# Patient Record
Sex: MALE | Race: BLACK OR AFRICAN AMERICAN
[De-identification: names, ages, dates, MRNs, and addresses within clinical notes are randomized per-mention and may not be internally consistent; named-entity substitution may affect disease eponyms.]

---

## 2019-08-30 ENCOUNTER — HOSPITAL ENCOUNTER (INPATIENT)
Dept: HOSPITAL 74 - JER | Age: 44
LOS: 7 days | Discharge: NURSING, REHAB OR LONG-TERM CARE | DRG: 60 | End: 2019-09-06
Attending: INTERNAL MEDICINE | Admitting: INTERNAL MEDICINE
Payer: COMMERCIAL

## 2019-08-30 VITALS — BODY MASS INDEX: 28.8 KG/M2

## 2019-08-30 DIAGNOSIS — K21.9: ICD-10-CM

## 2019-08-30 DIAGNOSIS — N39.498: ICD-10-CM

## 2019-08-30 DIAGNOSIS — E66.3: ICD-10-CM

## 2019-08-30 DIAGNOSIS — G89.29: ICD-10-CM

## 2019-08-30 DIAGNOSIS — G47.00: ICD-10-CM

## 2019-08-30 DIAGNOSIS — F41.8: ICD-10-CM

## 2019-08-30 DIAGNOSIS — E05.90: ICD-10-CM

## 2019-08-30 DIAGNOSIS — G35: Primary | ICD-10-CM

## 2019-08-30 DIAGNOSIS — F32.9: ICD-10-CM

## 2019-08-30 DIAGNOSIS — H53.8: ICD-10-CM

## 2019-08-30 DIAGNOSIS — R26.9: ICD-10-CM

## 2019-08-30 LAB
ALBUMIN SERPL-MCNC: 4.1 G/DL (ref 3.4–5)
ALP SERPL-CCNC: 80 U/L (ref 45–117)
ALT SERPL-CCNC: 44 U/L (ref 13–61)
ANION GAP SERPL CALC-SCNC: 4 MMOL/L (ref 8–16)
APPEARANCE UR: CLEAR
AST SERPL-CCNC: 18 U/L (ref 15–37)
BASOPHILS # BLD: 0.6 % (ref 0–2)
BILIRUB SERPL-MCNC: 1 MG/DL (ref 0.2–1)
BILIRUB UR STRIP.AUTO-MCNC: NEGATIVE MG/DL
BUN SERPL-MCNC: 12 MG/DL (ref 7–18)
CALCIUM SERPL-MCNC: 9.4 MG/DL (ref 8.5–10.1)
CHLORIDE SERPL-SCNC: 108 MMOL/L (ref 98–107)
CO2 SERPL-SCNC: 29 MMOL/L (ref 21–32)
COLOR UR: YELLOW
CREAT SERPL-MCNC: 1.1 MG/DL (ref 0.55–1.3)
DEPRECATED RDW RBC AUTO: 13.1 % (ref 11.9–15.9)
EOSINOPHIL # BLD: 2.3 % (ref 0–4.5)
GLUCOSE SERPL-MCNC: 89 MG/DL (ref 74–106)
HCT VFR BLD CALC: 40.4 % (ref 35.4–49)
HGB BLD-MCNC: 14 GM/DL (ref 11.7–16.9)
KETONES UR QL STRIP: (no result)
LEUKOCYTE ESTERASE UR QL STRIP.AUTO: NEGATIVE
LYMPHOCYTES # BLD: 36.4 % (ref 8–40)
MCH RBC QN AUTO: 30.5 PG (ref 25.7–33.7)
MCHC RBC AUTO-ENTMCNC: 34.7 G/DL (ref 32–35.9)
MCV RBC: 87.8 FL (ref 80–96)
MONOCYTES # BLD AUTO: 9.9 % (ref 3.8–10.2)
NEUTROPHILS # BLD: 50.8 % (ref 42.8–82.8)
NITRITE UR QL STRIP: NEGATIVE
PH UR: 7 [PH] (ref 5–8)
PLATELET # BLD AUTO: 271 K/MM3 (ref 134–434)
PMV BLD: 6.4 FL (ref 7.5–11.1)
POTASSIUM SERPLBLD-SCNC: 3.9 MMOL/L (ref 3.5–5.1)
PROT SERPL-MCNC: 6.7 G/DL (ref 6.4–8.2)
PROT UR QL STRIP: NEGATIVE
PROT UR QL STRIP: NEGATIVE
RBC # BLD AUTO: 4.59 M/MM3 (ref 4–5.6)
SODIUM SERPL-SCNC: 140 MMOL/L (ref 136–145)
SP GR UR: 1.02 (ref 1.01–1.03)
UROBILINOGEN UR STRIP-MCNC: 1 MG/DL (ref 0.2–1)
WBC # BLD AUTO: 5 K/MM3 (ref 4–10)

## 2019-08-30 PROCEDURE — A9579 GAD-BASE MR CONTRAST NOS,1ML: HCPCS

## 2019-08-30 RX ADMIN — METHYLPREDNISOLONE SODIUM SUCCINATE SCH MG: 125 INJECTION, POWDER, FOR SOLUTION INTRAMUSCULAR; INTRAVENOUS at 21:30

## 2019-08-30 NOTE — PN
Teaching Attending Note


Name of Resident: Catherine Morocho





ATTENDING PHYSICIAN STATEMENT





I saw and evaluated the patient.


I reviewed the resident's note and discussed the case with the resident.


I agree with the resident's findings and plan as documented.








SUBJECTIVE:


Patient is a 44 year old man with a PMH of MS (was on tecfidera, intermittent 

compliance for the past 6 months), who was sent to the ER by Dr. Edwards for 

further management for MS. The patient reports hes been having 6 months of 

worsening blurry vision, weakness, lower extremity numbness-tingling, and 

urinary incontinence. The patient was seen at Dr. Frazier office earlier today

, from where he was sent to the emergency department for further management. 

Patient  moved to [place] about 6 months ago from [place] and since then hes 

been poorly compliant with his medication. Denies fever, chills, nausea, 

vomiting, diarrhea, dysuria or hematuria. Denies alcohol abuse or use of 

illicit drugs. Nonsmoker.  





OBJECTIVE:


Alert


 Vital Signs











 Period  Temp  Pulse  Resp  BP Sys/Camacho  Pulse Ox


 


 Last 24 Hr  97.6 F  72-75  16-18  116-125/60-65  99-99








HEENT: No Jaundice, eye redness or discharge, PERRLA, EOMI. Normocephalic, 

atraumatic. External ears are normal and hearing is grossly intact. No nasal 

discharge.


Neck: Supple, nontender. No palpable adenopathy or thyromegaly. No JVD


Chest: Good effort. Clear to auscultation and percussion.


Heart: Regular. No S3, rub or murmur


Abdomen: Not distended, soft, nontender and no HSM. No rebound or guarding.  

Normal bowel sounds.


Ext: Peripheral pulses intact. No leg edema.


Skin: Warm and dry. No petechiae, rash or ecchymosis.


Neuro: Alert. Oriented x3. CN 2-12 grossly intact. Sensation grossly intact in 

all four extremities and DTR are symmetric.


Psych: Appropriate mood and affect. Good insight.


 Current Medications











Generic Name Dose Route Start Last Admin





  Trade Name Freq  PRN Reason Stop Dose Admin


 


Enoxaparin Sodium  40 mg  08/31/19 10:00  





  Lovenox -  SQ   





  DAILY [facility]   





     





     





     





     


 


Methylprednisolone Sodium Succinate  250 mg  08/30/19 20:45  08/30/19 21:30





  Solu-Medrol -  IVPUSH   250 mg





  Q6H LUBNA   Administration





     





     





     





     








 Abnormal Lab Results











  08/30/19 08/30/19 08/30/19





  21:07 21:07 21:07


 


MPV  6.4 L  


 


Chloride   108 H 


 


Anion Gap   4 L 


 


Urine Ketones    Trace H








ASSESSMENT AND PLAN:


1. Multiple sclerosis flareup - Patient started on Solumedrol 250 mg IV q 6 

hours as recommended by the Neurologist. Will give Protonix 40 mg PO qd. No 

acute abnormality on CXR. EKG shows NSR with no significant ST-T wave changes. 





2. Overweight  Counseled on the risks associated with being overweight. Will 

provide patient all the necessary assistance, counseling and positive 

reinforcement to facilitate weight loss. Consult dietician.





3. DVT prophylaxis - Lovenox 40 mg SQ q 24 hours.     





4. Advance directives - Full code

## 2019-08-30 NOTE — PDOC
History of Present Illness





- General


Chief Complaint: CVA/TIA


Stated Complaint: MULTIPLE SCLEROSIS


Time Seen by Provider: 08/30/19 20:34





- History of Present Illness


Initial Comments: 





Mr. Prabhakar is a 45 y/o male with hx of MS (dx 2011), presenting today with six 

months of worsening blurry vision, imbalance, urinary retention/incontinence, 

muscle weakness. He recently moved from [place] where he was following with 

a neurologist and established care with Dr. Edwards today. Sent in by Dr. Edwards 

for MS exacerbation and admission. He has not been able to consistently take 

his MS medications over the past several months. 








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  








Past History





- Past Medical History


Allergies/Adverse Reactions: 


 Allergies











Allergy/AdvReac Type Severity Reaction Status Date / Time


 


No Known Allergies Allergy   Verified 08/30/19 20:42











COPD: No


Other medical history: MS





- Suicide/Smoking/Psychosocial Hx


Smoking History: Never smoked


Have you smoked in the past 12 months: No


Information on smoking cessation initiated: No


Hx Alcohol Use: No


Drug/Substance Use Hx: No





Neuro Specific PMHX





- Complaint Specific PMHX


Multiple Sclerosis: Yes





**Review of Systems





- Review of Systems


Comments:: 





GENERAL/CONSTITUTIONAL: No fever or chills. Reports weakness. 


HEAD, EYES, EARS, NOSE AND THROAT: Reports blurry vision. No change in hearing. 

No sore throat._


CARDIOVASCULAR: No chest pain or shortness of breath_


RESPIRATORY: Denies cough, hemoptysis_


GASTROINTESTINAL: No nausea, vomiting, diarrhea or constipation._


GENITOURINARY: Reports urinary retention and incontinence. 


MUSCULOSKELETAL: No joint or muscle swelling or pain. No neck or back pain._


SKIN: No rash_


NEUROLOGIC: No headache, vertigo, loss of consciousness. Reports imbalance. 


ENDOCRINE: No increased thirst. No abnormal weight change_


HEMATOLOGIC/LYMPHATIC: No anemia, easy bleeding, or history of blood clots._


ALLERGIC/IMMUNOLOGIC: No hives or skin allergy._








*Physical Exam





- Vital Signs


 Last Vital Signs











Temp Pulse Resp BP Pulse Ox


 


 97.6 F   75   18   116/60   99 


 


 08/30/19 20:38  08/30/19 20:38  08/30/19 20:38  08/30/19 20:38  08/30/19 20:38














- Physical Exam


Comments: 





PE


GENERAL: Awake, alert, and oriented to person/place/time, in no acute distress_


HEAD: No signs of trauma, normocephalic, atraumatic _


EYES: PERRLA, EOMI, sclera anicteric, conjunctiva clear_


ENT: Hearing grossly normal, nares patent, oropharynx clear without exudates. 

No uvular deviation. Moist mucosa_


NECK: Normal ROM, supple, no lymphadenopathy, JVD, or masses_


LUNGS: No distress, speaks in full sentences, clear to auscultation bilaterally 

_


HEART: Regular rate and rhythm, normal S1 and S2, no murmurs appreciated, 

peripheral pulses normal and equal bilaterally._


ABDOMEN: Soft, pressure on palpation in suprapubic area, normoactive bowel 

sounds. No guarding, no rebound. No masses_


EXTREMITIES: Normal inspection, Normal range of motion, no edema. No clubbing 

or cyanosis_


SKIN: Warm, Dry, normal turgor, no rashes or lesions noted_





NEURO


CN II-XII tested and intact.


Sensation intact to sharp/dull differentiation in all extremities.


Motor: Normal tone and bulk. No abnormal movements appreciated. No pronator 

drift. Strength tested and 4/5 in bilateral wrist flexion/extension, elbow 

flexion/extension, shoulder abduction, straight leg raise, knee flexion/

extension, ankle dorsiflexion/plantarflexion. Patient has unsteady ambulation 

and walks with a cane.


Coordination: Finger to nose and heel to shin testing intact bilaterally.











**Heart Score/ECG Review





- ECG Intrepretation


Rhythm: Regular Rhythm


Comment:: 


62 bpm





- Axis


Axis: Normal





- ECG Impressions


Normal ECG: Yes


Non-specific ST Elevation: No


Ischemic Changes: No


Bradycardia: No





ED Treatment Course





- LABORATORY


CBC & Chemistry Diagram: 


 08/30/19 21:07





 08/30/19 21:07





Medical Decision Making





- Medical Decision Making





44M with hx of MS diagnosis in 2011, presenting with worsening blurry vision, 

imbalance, weakness, urinary incontinence/retention. Recently moved from 

[place] and established care with Dr. Edwards (neurologist) today. 





Sent in by Dr. Edwards for admission for MS exacerbation.





-CBC, CMP, UA, EKG, CXR for admission


-solumedrol 250 mg q6h 





08/30/19 22:08





Labs and UA reviewed and wnl.








*DC/Admit/Observation/Transfer


Diagnosis at time of Disposition: 


 Ataxia








- Discharge Dispostion


Condition at time of disposition: Stable


Decision to Admit order: Yes





- Referrals


Referrals: 


Zhanna Seth MD [Primary Care Provider] - 





- Patient Instructions





- Post Discharge Activity

## 2019-08-30 NOTE — PDOC
Rapid Medical Evaluation


Time Seen by Provider: 08/30/19 20:34


Medical Evaluation: 





08/30/19 20:36


I have performed a brief in-person evaluation of this patient.





The patient presents with a chief complaint of: Sent in by Dr Edwards for 

admission for MS flare. Per call in, pt to get solu-medrol Q6H x 3 days and to 

be admitted. Dr Edwards to be consulted. Pt saw Dr Edwards for first time today 

and was c/o feeling off balance w/ ataxia and feeling tingling in his feet, c/w 

his MS per pt. Ran out of meds 4 months ago and never refilled as he has been 

busy caring for his mother who recently passed. Recently;y moved to [place] from 

[place]





Pertinent physical exam findings:stable, rest of exam deferred to ED provider





I have ordered the following:labs/steroids





The patient will proceed to the ED for further evaluation.








**Discharge Disposition





- Diagnosis


 Ataxia








- Referrals





- Patient Instructions





- Post Discharge Activity

## 2019-08-31 LAB
ALBUMIN SERPL-MCNC: 4.2 G/DL (ref 3.4–5)
ALP SERPL-CCNC: 94 U/L (ref 45–117)
ALT SERPL-CCNC: 45 U/L (ref 13–61)
ANION GAP SERPL CALC-SCNC: 5 MMOL/L (ref 8–16)
AST SERPL-CCNC: 19 U/L (ref 15–37)
BASOPHILS # BLD: 0.2 % (ref 0–2)
BILIRUB SERPL-MCNC: 0.9 MG/DL (ref 0.2–1)
BUN SERPL-MCNC: 13.6 MG/DL (ref 7–18)
CALCIUM SERPL-MCNC: 9.4 MG/DL (ref 8.5–10.1)
CHLORIDE SERPL-SCNC: 104 MMOL/L (ref 98–107)
CO2 SERPL-SCNC: 28 MMOL/L (ref 21–32)
CREAT SERPL-MCNC: 1.1 MG/DL (ref 0.55–1.3)
DEPRECATED RDW RBC AUTO: 12.8 % (ref 11.9–15.9)
EOSINOPHIL # BLD: 0 % (ref 0–4.5)
GLUCOSE SERPL-MCNC: 156 MG/DL (ref 74–106)
HCT VFR BLD CALC: 43.6 % (ref 35.4–49)
HGB BLD-MCNC: 14.8 GM/DL (ref 11.7–16.9)
LYMPHOCYTES # BLD: 10.6 % (ref 8–40)
MAGNESIUM SERPL-MCNC: 2.2 MG/DL (ref 1.8–2.4)
MCH RBC QN AUTO: 30.3 PG (ref 25.7–33.7)
MCHC RBC AUTO-ENTMCNC: 34 G/DL (ref 32–35.9)
MCV RBC: 89.2 FL (ref 80–96)
MONOCYTES # BLD AUTO: 0.5 % (ref 3.8–10.2)
NEUTROPHILS # BLD: 88.7 % (ref 42.8–82.8)
PLATELET # BLD AUTO: 293 K/MM3 (ref 134–434)
PMV BLD: 7.1 FL (ref 7.5–11.1)
POTASSIUM SERPLBLD-SCNC: 4.1 MMOL/L (ref 3.5–5.1)
PROT SERPL-MCNC: 7 G/DL (ref 6.4–8.2)
RBC # BLD AUTO: 4.89 M/MM3 (ref 4–5.6)
SODIUM SERPL-SCNC: 138 MMOL/L (ref 136–145)
WBC # BLD AUTO: 4.8 K/MM3 (ref 4–10)

## 2019-08-31 RX ADMIN — METHYLPREDNISOLONE SODIUM SUCCINATE SCH MG: 125 INJECTION, POWDER, FOR SOLUTION INTRAMUSCULAR; INTRAVENOUS at 20:57

## 2019-08-31 RX ADMIN — GABAPENTIN SCH MG: 300 CAPSULE ORAL at 21:00

## 2019-08-31 RX ADMIN — Medication PRN MG: at 23:48

## 2019-08-31 RX ADMIN — ENOXAPARIN SODIUM SCH MG: 40 INJECTION SUBCUTANEOUS at 10:01

## 2019-08-31 RX ADMIN — METHYLPREDNISOLONE SODIUM SUCCINATE SCH MG: 125 INJECTION, POWDER, FOR SOLUTION INTRAMUSCULAR; INTRAVENOUS at 08:25

## 2019-08-31 RX ADMIN — METHYLPREDNISOLONE SODIUM SUCCINATE SCH MG: 125 INJECTION, POWDER, FOR SOLUTION INTRAMUSCULAR; INTRAVENOUS at 14:21

## 2019-08-31 RX ADMIN — METHYLPREDNISOLONE SODIUM SUCCINATE SCH MG: 125 INJECTION, POWDER, FOR SOLUTION INTRAMUSCULAR; INTRAVENOUS at 02:54

## 2019-08-31 RX ADMIN — ACETAMINOPHEN PRN MG: 325 TABLET ORAL at 15:33

## 2019-08-31 RX ADMIN — GABAPENTIN SCH MG: 300 CAPSULE ORAL at 11:36

## 2019-08-31 NOTE — PN
Physical Exam: 


SUBJECTIVE: Patient seen and examined, overall unchanged, still with left 

forearm/left leg tingling/numbness, blurry vision and some weakness. 








OBJECTIVE:





 Vital Signs











 Period  Temp  Pulse  Resp  BP Sys/Camacho  Pulse Ox


 


 Last 24 Hr  97.5 F-98.2 F  72-78  16-18  109-125/60-80  95-99








 Intake & Output











 08/28/19 08/29/19 08/30/19 08/31/19





 23:59 23:59 23:59 23:59


 


Intake Total    10


 


Balance    10


 


Weight   208 lb 206 lb 6.4 oz











GENERAL: lying in bed in no acute distress


HEENT: PERRL, EOMI


Neck: soft, supple, no JVD


Chest: CTAB, no rales or wheezing


Abdomen:soft, NT, ND


extremities: no edema


Neuro: AAOx3, PERRL, EOMI, facial symmetry, speech normal, tongue midline, 

power 5/5, decreased sensation over left face/left forearm/hand and left leg 

region, gait not observed














 Laboratory Results - last 24 hr











  08/30/19 08/30/19 08/30/19





  21:07 21:07 21:07


 


WBC  5.0  


 


RBC  4.59  


 


Hgb  14.0  


 


Hct  40.4  


 


MCV  87.8  


 


MCH  30.5  


 


MCHC  34.7  


 


RDW  13.1  


 


Plt Count  271  


 


MPV  6.4 L  


 


Absolute Neuts (auto)  2.5  


 


Neutrophils %  50.8  


 


Lymphocytes %  36.4  


 


Monocytes %  9.9  


 


Eosinophils %  2.3  


 


Basophils %  0.6  


 


Nucleated RBC %  0  


 


Sodium   140 


 


Potassium   3.9 


 


Chloride   108 H 


 


Carbon Dioxide   29 


 


Anion Gap   4 L 


 


BUN   12.0 


 


Creatinine   1.1 


 


Est GFR (CKD-EPI)AfAm   93.47 


 


Est GFR (CKD-EPI)NonAf   80.64 


 


Random Glucose   89 


 


Calcium   9.4 


 


Magnesium   


 


Total Bilirubin   1.0 


 


AST   18 


 


ALT   44 


 


Alkaline Phosphatase   80 


 


Total Protein   6.7 


 


Albumin   4.1 


 


Urine Color    Yellow


 


Urine Appearance    Clear


 


Urine pH    7.0


 


Ur Specific Gravity    1.024


 


Urine Protein    Negative


 


Urine Glucose (UA)    Negative


 


Urine Ketones    Trace H


 


Urine Blood    Negative


 


Urine Nitrite    Negative


 


Urine Bilirubin    Negative


 


Urine Urobilinogen    1.0


 


Ur Leukocyte Esterase    Negative














  08/31/19 08/31/19





  06:20 06:20


 


WBC  4.8 


 


RBC  4.89 


 


Hgb  14.8 


 


Hct  43.6 


 


MCV  89.2 


 


MCH  30.3 


 


MCHC  34.0 


 


RDW  12.8 


 


Plt Count  293 


 


MPV  7.1 L D 


 


Absolute Neuts (auto)  4.3 


 


Neutrophils %  88.7 H D 


 


Lymphocytes %  10.6  D 


 


Monocytes %  0.5 L D 


 


Eosinophils %  0.0  D 


 


Basophils %  0.2 


 


Nucleated RBC %  0 


 


Sodium   138


 


Potassium   4.1


 


Chloride   104


 


Carbon Dioxide   28


 


Anion Gap   5 L


 


BUN   13.6


 


Creatinine   1.1


 


Est GFR (CKD-EPI)AfAm   93.47


 


Est GFR (CKD-EPI)NonAf   80.64


 


Random Glucose   156 H


 


Calcium   9.4


 


Magnesium   2.2


 


Total Bilirubin   0.9


 


AST   19


 


ALT   45


 


Alkaline Phosphatase   94


 


Total Protein   7.0


 


Albumin   4.2


 


Urine Color  


 


Urine Appearance  


 


Urine pH  


 


Ur Specific Gravity  


 


Urine Protein  


 


Urine Glucose (UA)  


 


Urine Ketones  


 


Urine Blood  


 


Urine Nitrite  


 


Urine Bilirubin  


 


Urine Urobilinogen  


 


Ur Leukocyte Esterase  








Active Medications











Generic Name Dose Route Start Last Admin





  Trade Name Freq  PRN Reason Stop Dose Admin


 


Enoxaparin Sodium  40 mg  08/31/19 10:00  08/31/19 10:01





  Lovenox -  SQ   40 mg





  DAILY LUBNA   Administration





     





     





     





     


 


Gabapentin  300 mg  08/31/19 11:00  08/31/19 11:36





  Neurontin -  PO   300 mg





  BID LUBNA   Administration





     





     





     





     


 


Melatonin  3 mg  08/31/19 04:04  





  Melatonin  PO   





  HS PRN   





  INSOMNIA   





     





     





     


 


Methylprednisolone Sodium Succinate  250 mg  08/30/19 20:45  08/31/19 08:25





  Solu-Medrol -  IVPUSH   250 mg





  Q6H LUBNA   Administration





     





     





     





     











ASSESSMENT/PLAN:


44 yom with PMHx of Multiple Sclerosis (2002) prior on tecfidera, recently lost 

to follow up sent by Dr. Edwards with concerns for MS flare 





-Multiple sclerosis flare vs progressive disease





Plan:


Neurology input noted.


Solumedrol 250 mg IV q6h x 5 days. 


Follow up MRI brain/C-spine/T-spine. 


JCV titers, NMO Ab, TSH, CLARENCE, ESR, lyme


DVTPPx Lovenox


PT eval


Outaptient follow up for immunomodulator. 


dispo home vs SNF based on clinical course. 





Visit type





- Emergency Visit


Emergency Visit: Yes


ED Registration Date: 08/30/19


Care time: The patient presented to the Emergency Department on the above date 

and was hospitalized for further evaluation of their emergent condition.





- New Patient


This patient is new to me today: Yes


Date on this admission: 08/31/19





- Critical Care


Critical Care patient: No





- Discharge Referral


Referred to [facility] No
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
## 2019-08-31 NOTE — CON.NEURO
Consult





- History of Present Illness


History of Present Illness: 


44 year old man with a PMH of MS (was on tecfidera, intermittent compliance for 

the past 6 months), who was sent by me first time yesetrday in office  for 

further management for MS. The patient reports hes been having 6 months of 

worsening blurry vision, weakness, lower extremity numbness-tingling, and 

urinary incontinence.  Patient  moved to [place] about 6 months ago from [place] 

and hasd been on Tecfidera sincve 2013 but ran out of RX apx 2 months ago.  

Denies fever, chills, nausea, vomiting, diarrhea, dysuria or hematuria. Denies 

alcohol abuse or use of illicit drugs. Nonsmoker.  ++ walking disability, and 

now needs cane. 











- Alcohol/Substance Use


Hx Alcohol Use: No





- Smoking History


Smoking history: Never smoked


Have you smoked in the past 12 months: No





Home Medications





- Allergies


Allergies/Adverse Reactions: 


 Allergies











Allergy/AdvReac Type Severity Reaction Status Date / Time


 


No Known Allergies Allergy   Verified 08/30/19 20:42














Physical Exam-Neuro


Vital Signs: 


 Vital Signs











Temperature  98.2 F   08/31/19 06:09


 


Pulse Rate  78   08/31/19 06:09


 


Respiratory Rate  18   08/31/19 06:09


 


Blood Pressure  109/73   08/31/19 06:09


 


O2 Sat by Pulse Oximetry (%)  95   08/31/19 06:00











Constitutional: Yes: Well Nourished, No Distress


Labs: 


 CBC, BMP





 08/31/19 06:20 





 08/31/19 06:20 











- Neuro Exam


Level Of Consciousness: Yes: Alert, Oriented to Person (EOmi, no facial, motor 5

/5, inc tone in legs, L >R, brisk reflexes , and requires assitance when he 

walks )





Problem List





- Problems


(1) Multiple sclerosis exacerbation


Code(s): G35 - MULTIPLE SCLEROSIS   





(2) Gait abnormality


Code(s): R26.9 - UNSPECIFIED ABNORMALITIES OF GAIT AND MOBILITY   





Assessment/Plan


44 year old man with a PMH of MS (was on tecfidera, intermittent compliance for 

the past 6 months), who was sent by me first time yesetrday in office  for 

further management for MS. The patient reports hes been having 6 months of 

worsening blurry vision, weakness, lower extremity numbness-tingling, and 

urinary incontinence.  Patient  moved to [place] about 6 months ago from [place] 

and hasd been on Tecfidera sincve 2013 but ran out of RX apx 2 months ago.  

Denies fever, chills, nausea, vomiting, diarrhea, dysuria or hematuria. Denies 

alcohol abuse or use of illicit drugs. Nonsmoker.  ++ walking disability, and 

now needs cane. 


AP : 


MS exacerbation, progressive vs relapse 


started on SOlumedrol IV 250IV q6 x 5 days 


check MRI BRAIN, C , T spine with JASON


JCV titers, NMO AB, TSH, CLARENCE , ESR, LYme 


rehab 


as out pt will decide which immunomodulator to restart Ocreveus vs Mayzent vs 

Tyzabri 








DR MARRERO

## 2019-08-31 NOTE — EKG
Test Reason : 

Blood Pressure : ***/*** mmHG

Vent. Rate : 062 BPM     Atrial Rate : 062 BPM

   P-R Int : 146 ms          QRS Dur : 088 ms

    QT Int : 364 ms       P-R-T Axes : 058 052 053 degrees

   QTc Int : 369 ms

 

NORMAL SINUS RHYTHM

NORMAL ECG

NO PREVIOUS ECGS AVAILABLE

Confirmed by MERRITT HANLEY MD ([id number]) on 8/31/2019 11:33:38 AM

 

Referred By:             Confirmed By:MERRITT HANLEY MD

## 2019-09-01 RX ADMIN — GABAPENTIN SCH MG: 300 CAPSULE ORAL at 09:07

## 2019-09-01 RX ADMIN — ALUMINUM HYDROXIDE, MAGNESIUM HYDROXIDE, AND SIMETHICONE PRN ML: 200; 200; 20 SUSPENSION ORAL at 19:31

## 2019-09-01 RX ADMIN — ALUMINUM HYDROXIDE, MAGNESIUM HYDROXIDE, AND SIMETHICONE PRN ML: 200; 200; 20 SUSPENSION ORAL at 10:43

## 2019-09-01 RX ADMIN — METHYLPREDNISOLONE SODIUM SUCCINATE SCH MG: 125 INJECTION, POWDER, FOR SOLUTION INTRAMUSCULAR; INTRAVENOUS at 21:14

## 2019-09-01 RX ADMIN — ENOXAPARIN SODIUM SCH MG: 40 INJECTION SUBCUTANEOUS at 09:14

## 2019-09-01 RX ADMIN — GABAPENTIN SCH MG: 300 CAPSULE ORAL at 22:41

## 2019-09-01 RX ADMIN — ACETAMINOPHEN PRN MG: 325 TABLET ORAL at 22:42

## 2019-09-01 RX ADMIN — METHYLPREDNISOLONE SODIUM SUCCINATE SCH MG: 125 INJECTION, POWDER, FOR SOLUTION INTRAMUSCULAR; INTRAVENOUS at 14:20

## 2019-09-01 RX ADMIN — METHYLPREDNISOLONE SODIUM SUCCINATE SCH MG: 125 INJECTION, POWDER, FOR SOLUTION INTRAMUSCULAR; INTRAVENOUS at 02:30

## 2019-09-01 RX ADMIN — Medication PRN MG: at 22:42

## 2019-09-01 RX ADMIN — PANTOPRAZOLE SODIUM SCH MG: 40 TABLET, DELAYED RELEASE ORAL at 09:07

## 2019-09-01 RX ADMIN — ACETAMINOPHEN PRN MG: 325 TABLET ORAL at 10:43

## 2019-09-01 RX ADMIN — METHYLPREDNISOLONE SODIUM SUCCINATE SCH MG: 125 INJECTION, POWDER, FOR SOLUTION INTRAMUSCULAR; INTRAVENOUS at 09:13

## 2019-09-01 NOTE — EKG
Test Reason : 

Blood Pressure : ***/*** mmHG

Vent. Rate : 077 BPM     Atrial Rate : 077 BPM

   P-R Int : 144 ms          QRS Dur : 096 ms

    QT Int : 340 ms       P-R-T Axes : 067 062 063 degrees

   QTc Int : 384 ms

 

NORMAL SINUS RHYTHM

NORMAL ECG

WHEN COMPARED WITH ECG OF 30-AUG-2019 20:54,

NO SIGNIFICANT CHANGE WAS FOUND

Confirmed by MERRITT HANLEY MD ([id number]) on 9/1/2019 11:23:42 AM

 

Referred By: SENIA GONZALEZ           Confirmed By:MERRITT HANLEY MD

## 2019-09-01 NOTE — PN
Physical Exam: 


SUBJECTIVE: Patient seen and examined, tingling/numbness improved. reports some 

low back pain. But overall better, ambulating. 








OBJECTIVE:





 Vital Signs











 Period  Temp  Pulse  Resp  BP Sys/Camacho  Pulse Ox


 


 Last 24 Hr  97.9 F-98.5 F  74-93  18-18  118-138/68-78  96-96








 Intake & Output











 08/29/19 08/30/19 08/31/19 09/01/19





 23:59 23:59 23:59 23:59


 


Intake Total   810 360


 


Balance   810 360


 


Weight  208 lb 206 lb 6.4 oz 

















GENERAL: lying in bed in no acute distress


HEENT: PERRL, EOMI


Neck: soft, supple, no JVD


Chest: CTAB, no rales or wheezing


Abdomen:soft, NT, ND


extremities: no edema


Neuro: AAOx3, PERRL, EOMI, facial symmetry, speech normal, tongue midline, 

power 5/5, decreased sensation over left face/left forearm/hand and left leg 

region but improved exam per patient, gait not observed








Active Medications











Generic Name Dose Route Start Last Admin





  Trade Name Freq  PRN Reason Stop Dose Admin


 


Acetaminophen  650 mg  08/31/19 14:43  09/01/19 10:43





  Tylenol -  PO   650 mg





  Q6H PRN   Administration





  HEADACHE   





     





     





     


 


Al Hydroxide/Mg Hydroxide  30 ml  09/01/19 10:03  09/01/19 10:43





  Mylanta Oral Suspension -  PO   30 ml





  Q6H PRN   Administration





  DYSPEPSIA   





     





     





     


 


Enoxaparin Sodium  40 mg  08/31/19 10:00  09/01/19 09:14





  Lovenox -  SQ   40 mg





  DAILY LUBNA   Administration





     





     





     





     


 


Gabapentin  300 mg  08/31/19 11:00  09/01/19 09:07





  Neurontin -  PO   300 mg





  BID LUBNA   Administration





     





     





     





     


 


Melatonin  3 mg  08/31/19 04:04  08/31/19 23:48





  Melatonin  PO   3 mg





  HS PRN   Administration





  INSOMNIA   





     





     





     


 


Methylprednisolone Sodium Succinate  250 mg  08/30/19 20:45  09/01/19 09:13





  Solu-Medrol -  IVPUSH   250 mg





  Q6H LUBNA   Administration





     





     





     





     


 


Pantoprazole Sodium  40 mg  09/01/19 10:00  09/01/19 09:07





  Protonix -  PO   40 mg





  DAILY LUBNA   Administration





     





     





     





     











ASSESSMENT/PLAN:


44 yom with PMHx of Multiple Sclerosis (2002) prior on tecfidera, recently lost 

to follow up sent by Dr. Edwards with concerns for MS flare 





-Multiple sclerosis flare vs progressive disease


-GERD





Plan:


Neurology input noted.


Solumedrol 250 mg IV q6h x 5 days. 


Follow up MRI brain/C-spine/T-spine. 


Follow up JCV titers, NMO Ab, TSH, CLARENCE, ESR, lyme


Add PPI/Maalox


DVTPPx Lovenox


PT eval


Outaptient follow up for immunomodulator. 


dispo home vs SNF based on clinical course. 


Discussed with patient and nursing. 








Visit type





- Emergency Visit


Emergency Visit: Yes


ED Registration Date: 08/30/19


Care time: The patient presented to the Emergency Department on the above date 

and was hospitalized for further evaluation of their emergent condition.





- New Patient


This patient is new to me today: No





- Critical Care


Critical Care patient: No





- Discharge Referral


Referred to [facility] No

## 2019-09-01 NOTE — PN
Progress Note (short form)





- Note


Progress Note: 


44 year old man with a PMH of MS (was on tecfidera, intermittent compliance for 

the past 6 months), who was sent by me first time yesetrday in office  for 

further management for MS. The patient reports hes been having 6 months of 

worsening blurry vision, weakness, lower extremity numbness-tingling, and 

urinary incontinence.  Patient  moved to [place] about 6 months ago from [place] 

and hasd been on Tecfidera sincve 2013 but ran out of RX apx 2 months ago.  

Denies fever, chills, nausea, vomiting, diarrhea, dysuria or hematuria. Denies 

alcohol abuse or use of illicit drugs. Nonsmoker.  ++ walking disability, and 

now needs cane. 





FU: 


MRI reviewed prelim: white matter disease periventricular; MRI C pine diffuse 

white matter isgnal abnl spanning multiple segments --more suggestive of NMO 

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 





- Alcohol/Substance Use


Hx Alcohol Use: No





- Smoking History


Smoking history: Never smoked


Have you smoked in the past 12 months: No





Home Medications





- Allergies


Allergies/Adverse Reactions: 


 Allergies











Allergy/AdvReac Type Severity Reaction Status Date / Time


 


No Known Allergies Allergy   Verified 08/30/19 20:42














Physical Exam-Neuro


Vital Signs: 


 Vital Signs











Temperature  98.2 F   08/31/19 06:09


 


Pulse Rate  78   08/31/19 06:09


 


Respiratory Rate  18   08/31/19 06:09


 


Blood Pressure  109/73   08/31/19 06:09


 


O2 Sat by Pulse Oximetry (%)  95   08/31/19 06:00











Constitutional: Yes: Well Nourished, No Distress


Labs: 


 CBC, BMP





 08/31/19 06:20 





 08/31/19 06:20 











- Neuro Exam


Level Of Consciousness: Yes: Alert, Oriented to Person (EOmi, no facial, motor 5

/5, inc tone in legs, L >R, brisk reflexes , and requires assitance when he 

walks )





Problem List





- Problems


(1) Multiple sclerosis exacerbation


Code(s): G35 - MULTIPLE SCLEROSIS   





(2) Gait abnormality


Code(s): R26.9 - UNSPECIFIED ABNORMALITIES OF GAIT AND MOBILITY   





Assessment/Plan


44 year old man with a PMH of MS (was on tecfidera, intermittent compliance for 

the past 6 months), who was sent by me first time yesetrday in office  for 

further management for MS. The patient reports hes been having 6 months of 

worsening blurry vision, weakness, lower extremity numbness-tingling, and 

urinary incontinence.  Patient  moved to [place] about 6 months ago from [place] 

and hasd been on Tecfidera sincve 2013 but ran out of RX apx 2 months ago.  

Denies fever, chills, nausea, vomiting, diarrhea, dysuria or hematuria. Denies 

alcohol abuse or use of illicit drugs. Nonsmoker.  ++ walking disability, and 

now needs cane. 


AP : 


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 


started on SOlumedrol IV 250IV q6 x 5 days 


MRI BRAIN, C , T spine with JASON--multifocal diffuse signal chnages in MRI C 

spine more suggestive of NMO disease 


JCV titers, NMO AB, TSH, CLARENCE , ESR, LYme --


rehab 


as out pt will decide which immunomodulator to restart Ocreveus vs Mayzent vs 

Tyzabri 








DR MARRERO 














Problem List





- Problems


(1) Multiple sclerosis exacerbation


Code(s): G35 - MULTIPLE SCLEROSIS   





(2) Gait abnormality


Code(s): R26.9 - UNSPECIFIED ABNORMALITIES OF GAIT AND MOBILITY

## 2019-09-02 RX ADMIN — PANTOPRAZOLE SODIUM SCH MG: 40 TABLET, DELAYED RELEASE ORAL at 10:11

## 2019-09-02 RX ADMIN — ENOXAPARIN SODIUM SCH MG: 40 INJECTION SUBCUTANEOUS at 10:10

## 2019-09-02 RX ADMIN — METHYLPREDNISOLONE SODIUM SUCCINATE SCH MG: 125 INJECTION, POWDER, FOR SOLUTION INTRAMUSCULAR; INTRAVENOUS at 16:05

## 2019-09-02 RX ADMIN — METHYLPREDNISOLONE SODIUM SUCCINATE SCH MG: 125 INJECTION, POWDER, FOR SOLUTION INTRAMUSCULAR; INTRAVENOUS at 20:59

## 2019-09-02 RX ADMIN — GABAPENTIN SCH MG: 300 CAPSULE ORAL at 10:11

## 2019-09-02 RX ADMIN — METHYLPREDNISOLONE SODIUM SUCCINATE SCH MG: 125 INJECTION, POWDER, FOR SOLUTION INTRAMUSCULAR; INTRAVENOUS at 02:06

## 2019-09-02 RX ADMIN — METHYLPREDNISOLONE SODIUM SUCCINATE SCH MG: 125 INJECTION, POWDER, FOR SOLUTION INTRAMUSCULAR; INTRAVENOUS at 10:10

## 2019-09-02 RX ADMIN — ACETAMINOPHEN PRN MG: 325 TABLET ORAL at 10:13

## 2019-09-02 NOTE — PN
Progress Note (short form)





- Note


Progress Note: 


44 year old man with a PMH of MS (was on tecfidera, intermittent compliance for 

the past 6 months), who was sent by me first time yesetrday in office  for 

further management for MS. The patient reports hes been having 6 months of 

worsening blurry vision, weakness, lower extremity numbness-tingling, and 

urinary incontinence.  Patient  moved to [place] about 6 months ago from [place] 

and hasd been on Tecfidera sincve 2013 but ran out of RX apx 2 months ago.  

Denies fever, chills, nausea, vomiting, diarrhea, dysuria or hematuria. Denies 

alcohol abuse or use of illicit drugs. Nonsmoker.  ++ walking disability, and 

now needs cane. 





FU: 





still with pain in low back and legs 


+ numbness


some improvement on steroids


MRI reviewed: white matter disease periventricular; MRI C pine diffuse white 

matter signal abnl spanning multiple segments --more suggestive of NMO spectrum 

DO than multiple SClerosis 





MRI : 


Impression: Extensive demyelinating disease of the upper, mid and lower 

cervical cord also upper thoracic cord with no evidence of enhancing active 

demyelinating plaques. No evidence of cervical disc herniation or cord 

compression. 


Impression: Extensive demyelinating disease both cerebral hemispheres prominent 

centrum semiovale. No evidence acute infarct No suspicious enhancing active 

demyelinating plaques. No evidence acute intracerebral hemorrhage, subdural 

fluid collection or hydrocephalus. Large left maxillary sinus submucosal 

retention cyst sequela of sinusitis. Please note no prior MRI of the brain 

available for comparison when available an addendum will be dictated.. 











- Alcohol/Substance Use














Hx Alcohol Use: No





- Smoking History


Smoking history: Never smoked


Have you smoked in the past 12 months: No





Home Medications





- Allergies


Allergies/Adverse Reactions: 


 Allergies











Allergy/AdvReac Type Severity Reaction Status Date / Time


 


No Known Allergies Allergy   Verified 08/30/19 20:42














Physical Exam-Neuro


Vital Signs: 


  Vital Signs











Temperature  98.0 F   09/01/19 22:00


 


Pulse Rate  88   09/01/19 22:00


 


Respiratory Rate  18   09/01/19 22:00


 


Blood Pressure  128/71   09/01/19 22:00


 


O2 Sat by Pulse Oximetry (%)  96   09/01/19 21:00














Constitutional: Yes: Well Nourished, No Distress


Labs: 


 CBCD











WBC  4.8 K/mm3 (4.0-10.0)   08/31/19  06:20    


 


RBC  4.89 M/mm3 (4.00-5.60)   08/31/19  06:20    


 


Hgb  14.8 GM/dL (11.7-16.9)   08/31/19  06:20    


 


Hct  43.6 % (35.4-49)   08/31/19  06:20    


 


MCV  89.2 fl (80-96)   08/31/19  06:20    


 


MCHC  34.0 g/dl (32.0-35.9)   08/31/19  06:20    


 


RDW  12.8 % (11.9-15.9)   08/31/19  06:20    


 


Plt Count  293 K/MM3 (134-434)   08/31/19  06:20    


 


MPV  7.1 fl (7.5-11.1)  L D 08/31/19  06:20    








 CMP











Sodium  138 mmol/L (136-145)   08/31/19  06:20    


 


Potassium  4.1 mmol/L (3.5-5.1)   08/31/19  06:20    


 


Chloride  104 mmol/L ()   08/31/19  06:20    


 


Carbon Dioxide  28 mmol/L (21-32)   08/31/19  06:20    


 


Anion Gap  5 MMOL/L (8-16)  L  08/31/19  06:20    


 


BUN  13.6 mg/dL (7-18)   08/31/19  06:20    


 


Creatinine  1.1 mg/dL (0.55-1.3)   08/31/19  06:20    


 


Calcium  9.4 mg/dL (8.5-10.1)   08/31/19  06:20    


 


Total Bilirubin  0.9 mg/dL (0.2-1)   08/31/19  06:20    


 


AST  19 U/L (15-37)   08/31/19  06:20    


 


ALT  45 U/L (13-61)   08/31/19  06:20    


 


Alkaline Phosphatase  94 U/L ()   08/31/19  06:20    


 


Total Protein  7.0 g/dl (6.4-8.2)   08/31/19  06:20    


 


Albumin  4.2 g/dl (3.4-5.0)   08/31/19  06:20    

















- Neuro Exam


Level Of Consciousness: Yes: Alert, Oriented to Person (EOmi, no facial, motor 5

/5, inc tone in legs, L >R, brisk reflexes , and requires assitance when he 

walks )





Problem List





- Problems


(1) Multiple sclerosis exacerbation


Code(s): G35 - MULTIPLE SCLEROSIS   





(2) Gait abnormality


Code(s): R26.9 - UNSPECIFIED ABNORMALITIES OF GAIT AND MOBILITY   





Assessment/Plan


44 year old man with a PMH of MS (was on tecfidera, intermittent compliance for 

the past 6 months), who was sent by me first time yesetrday in office  for 

further management for MS. The patient reports hes been having 6 months of 

worsening blurry vision, weakness, lower extremity numbness-tingling, and 

urinary incontinence.  Patient  moved to [place] about 6 months ago from [place] 

and hasd been on Tecfidera sincve 2013 but ran out of RX apx 2 months ago.  

Denies fever, chills, nausea, vomiting, diarrhea, dysuria or hematuria. Denies 

alcohol abuse or use of illicit drugs. Nonsmoker.  ++ walking disability, and 

now needs cane. 





AP : 


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 


started on SOlumedrol IV 250IV q6 x 5 days 


MRI BRAIN, C , T spine with JASON--multifocal diffuse signal chnages in MRI C 

spine more suggestive of NMO disease 





await T spine and get MRI LS SPINE --r/o DJD factors for low back pain , 

premeditate with valium 





INC gabapentin 600BID 





JCV titers, NMO AB, TSH, CLARENCE , ESR, LYme --P 


rehab 


as out pt will decide which immunomodulator to restart Ocreveus vs Mayzent vs 

Tyzabri 








DR MARRERO 











Problem List





- Problems


(1) Multiple sclerosis exacerbation


Code(s): G35 - MULTIPLE SCLEROSIS   





(2) Gait abnormality


Code(s): R26.9 - UNSPECIFIED ABNORMALITIES OF GAIT AND MOBILITY

## 2019-09-02 NOTE — PN
Teaching Attending Note


Name of Resident: Indra Gilmore





ATTENDING PHYSICIAN STATEMENT





I saw and evaluated the patient.


I reviewed the resident's note and discussed the case with the resident.


I agree with the resident's findings and plan as documented with exceptions 

below.








SUBJECTIVE:


Patient seen and examined. tingling numbness improved. low back pain but no new 

weakness, abdominal or urinary symptoms. 





OBJECTIVE:


 Vital Signs











 Period  Temp  Pulse  Resp  BP Sys/Camacho  Pulse Ox


 


 Last 24 Hr  97.9 F-98.0 F  73-90  18-18  119-136/71-90  96








 Intake & Output











 08/30/19 08/31/19 09/01/19 09/02/19





 23:59 23:59 23:59 23:59


 


Intake Total  810 1610 600


 


Output Total    300


 


Balance  810 1610 300


 


Weight 208 lb 206 lb 6.4 oz  








GENERAL: lying in bed in no acute distress


HEENT: PERRL, EOMI


Neck: soft, supple, no JVD


Chest: CTAB, no rales or wheezing


Abdomen:soft, NT, ND


extremities: no edema


Neuro: AAOx3, PERRL, EOMI, facial symmetry, speech normal, tongue midline, 

power 5/5, decreased sensation over left forearm/hand and left leg region but 

improved exam per patient, sensation symmetric face bilaterally, gait not 

observed





 Home Medications











 Medication  Instructions  Recorded


 


NK [No Known Home Medication]  08/31/19








Active Medications





Acetaminophen (Tylenol -)  650 mg PO Q6H PRN


   PRN Reason: HEADACHE


   Last Admin: 09/02/19 10:13 Dose:  650 mg


Al Hydroxide/Mg Hydroxide (Mylanta Oral Suspension -)  30 ml PO Q6H PRN


   PRN Reason: DYSPEPSIA


   Last Admin: 09/01/19 19:31 Dose:  30 ml


Enoxaparin Sodium (Lovenox -)  40 mg SQ DAILY LUBNA


   Last Admin: 09/02/19 10:10 Dose:  40 mg


Lorazepam (Ativan Injection -)  0.25 mg IVPUSH ONCE ONE


   Stop: 09/02/19 11:59


Melatonin (Melatonin)  3 mg PO HS PRN


   PRN Reason: INSOMNIA


   Last Admin: 09/01/19 22:42 Dose:  3 mg


Methylprednisolone Sodium Succinate (Solu-Medrol -)  250 mg IVPUSH Q6H LUBNA


   Last Admin: 09/02/19 10:10 Dose:  250 mg


Pantoprazole Sodium (Protonix -)  40 mg PO DAILY LUBNA


   Last Admin: 09/02/19 10:11 Dose:  40 mg





 Laboratory Results - last 24 hr











  09/02/19





  06:18


 


ESR  3








MRI brain/-Cspine results noted





ASSESSMENT AND PLAN:


44 yom with PMHx of Multiple Sclerosis (2002) prior on tecfidera, recently lost 

to follow up sent by Dr. Edwards with concerns for MS flare 





-Multiple sclerosis exacerbation, progression vs relapse, vs Neuromyelitis 

Optica spectrum Disorder


-Low back pain


-GERD





Plan:


Neurology input noted.


Solumedrol 250 mg IV q6h x 5 days. 


MRI brain/C-spine noted. Follow up MRI T/LS spine. 


Follow up JCV titers, NMO Ab, TSH, CLARENCE, ESR, lyme


PPI/Maalox


DVTPPx Lovenox


PT eval


Outaptient follow up for immunomodulator. 


Dispo home vs SNF based on clinical course. 


Discussed with patient and nursing.

## 2019-09-02 NOTE — PN
Physical Exam: 


SUBJECTIVE: Patient seen and examined at bedside. States symptoms are improving

, but still not at baseline.








OBJECTIVE:





 Vital Signs











 Period  Temp  Pulse  Resp  BP Sys/Camacho  Pulse Ox


 


 Last 24 Hr  97.9 F-98.8 F  88-93  18-20  119-143/71-86  96











GEN: NAD, AAOx3


HEENT: NCAT, EOMI


Neck: supple, no jvd


Cardio: RRR, normal s1s2, no mrg


Pulm: cta b/l


Abd: soft, nontender, nondistended


Ext: 2+ pulses, no edema


Neuro: strength 3/5 LUE, LLE, 4/5 RUE, RLE, sensation decreased on R side, 

Hyperreflexic R compared to L














 Laboratory Results - last 24 hr











  09/02/19





  06:18


 


ESR  3








Active Medications











Generic Name Dose Route Start Last Admin





  Trade Name Freq  PRN Reason Stop Dose Admin


 


Acetaminophen  650 mg  08/31/19 14:43  09/02/19 10:13





  Tylenol -  PO   650 mg





  Q6H PRN   Administration





  HEADACHE   





     





     





     


 


Al Hydroxide/Mg Hydroxide  30 ml  09/01/19 10:03  09/01/19 19:31





  Mylanta Oral Suspension -  PO   30 ml





  Q6H PRN   Administration





  DYSPEPSIA   





     





     





     


 


Diazepam  5 mg  09/02/19 12:27  





  Valium -  PO  09/02/19 12:28  





  ONCE ONE   





     





     





     





     


 


Enoxaparin Sodium  40 mg  08/31/19 10:00  09/02/19 10:10





  Lovenox -  SQ   40 mg





  DAILY LUBNA   Administration





     





     





     





     


 


Lorazepam  0.25 mg  09/02/19 11:58  





  Ativan Injection -  IVPUSH  09/02/19 11:59  





  ONCE ONE   





     





     





     





     


 


Melatonin  3 mg  08/31/19 04:04  09/01/19 22:42





  Melatonin  PO   3 mg





  HS PRN   Administration





  INSOMNIA   





     





     





     


 


Methylprednisolone Sodium Succinate  250 mg  08/30/19 20:45  09/02/19 10:10





  Solu-Medrol -  IVPUSH   250 mg





  Q6H LUBNA   Administration





     





     





     





     


 


Pantoprazole Sodium  40 mg  09/01/19 10:00  09/02/19 10:11





  Protonix -  PO   40 mg





  DAILY LUBNA   Administration





     





     





     





     











ASSESSMENT/PLAN:





44M with hx of MS diagnosis in 2011, presenting with worsening blurry vision, 

imbalance, weakness, urinary incontinence/retention. Recently moved from 

[place] and established care with Dr. Edwards (neurologist) today. 





# MS Flare 


- Neuro on board, Dr. Edwards


- Jose Guadalupe-Medrol 250mg IVPUSH q6h


- Neuro checks q6h


- Brain MRI and Cervical / Thoracic spine MRI with contrast showing extensive 

lesions 


-Neuromyelitis Optica screen pending


-Lyme pending





#Insomnia


-Melatonin qHS for sleep 








#FEN


- PO fluids


- replete lytes PRN


- regular diet


#PPx


- Lovenox 40mg SQ daily, encourage ambulation








# Dispo: admit to med-surg 

















Visit type





- Emergency Visit


Emergency Visit: No





- New Patient


This patient is new to me today: Yes


Date on this admission: 09/02/19





- Critical Care


Critical Care patient: No





ATTENDING PHYSICIAN STATEMENT





I saw and evaluated the patient.


I reviewed the resident's note and discussed the case with the resident.


I agree with the resident's findings and plan as documented.








SUBJECTIVE:








OBJECTIVE:








ASSESSMENT AND PLAN:

## 2019-09-03 LAB
ANION GAP SERPL CALC-SCNC: 4 MMOL/L (ref 8–16)
BUN SERPL-MCNC: 23.4 MG/DL (ref 7–18)
CALCIUM SERPL-MCNC: 8.6 MG/DL (ref 8.5–10.1)
CHLORIDE SERPL-SCNC: 107 MMOL/L (ref 98–107)
CO2 SERPL-SCNC: 29 MMOL/L (ref 21–32)
CREAT SERPL-MCNC: 1 MG/DL (ref 0.55–1.3)
DEPRECATED RDW RBC AUTO: 13 % (ref 11.9–15.9)
GLUCOSE SERPL-MCNC: 126 MG/DL (ref 74–106)
HCT VFR BLD CALC: 39.2 % (ref 35.4–49)
HGB BLD-MCNC: 13.4 GM/DL (ref 11.7–16.9)
MCH RBC QN AUTO: 30.2 PG (ref 25.7–33.7)
MCHC RBC AUTO-ENTMCNC: 34.1 G/DL (ref 32–35.9)
MCV RBC: 88.5 FL (ref 80–96)
PLATELET # BLD AUTO: 271 K/MM3 (ref 134–434)
PMV BLD: 7.1 FL (ref 7.5–11.1)
POTASSIUM SERPLBLD-SCNC: 3.8 MMOL/L (ref 3.5–5.1)
RBC # BLD AUTO: 4.43 M/MM3 (ref 4–5.6)
SODIUM SERPL-SCNC: 141 MMOL/L (ref 136–145)
WBC # BLD AUTO: 8.3 K/MM3 (ref 4–10)

## 2019-09-03 RX ADMIN — METHYLPREDNISOLONE SODIUM SUCCINATE SCH MG: 125 INJECTION, POWDER, FOR SOLUTION INTRAMUSCULAR; INTRAVENOUS at 09:42

## 2019-09-03 RX ADMIN — GABAPENTIN SCH MG: 300 CAPSULE ORAL at 22:16

## 2019-09-03 RX ADMIN — PANTOPRAZOLE SODIUM SCH MG: 40 TABLET, DELAYED RELEASE ORAL at 09:41

## 2019-09-03 RX ADMIN — Medication PRN MG: at 00:09

## 2019-09-03 RX ADMIN — ENOXAPARIN SODIUM SCH MG: 40 INJECTION SUBCUTANEOUS at 09:42

## 2019-09-03 RX ADMIN — Medication PRN MG: at 22:56

## 2019-09-03 RX ADMIN — METHYLPREDNISOLONE SODIUM SUCCINATE SCH MG: 125 INJECTION, POWDER, FOR SOLUTION INTRAMUSCULAR; INTRAVENOUS at 14:50

## 2019-09-03 RX ADMIN — GABAPENTIN SCH MG: 300 CAPSULE ORAL at 09:41

## 2019-09-03 RX ADMIN — NORTRIPTYLINE HYDROCHLORIDE SCH MG: 25 CAPSULE ORAL at 18:38

## 2019-09-03 RX ADMIN — ACETAMINOPHEN PRN MG: 325 TABLET ORAL at 00:10

## 2019-09-03 RX ADMIN — METHYLPREDNISOLONE SODIUM SUCCINATE SCH MG: 125 INJECTION, POWDER, FOR SOLUTION INTRAMUSCULAR; INTRAVENOUS at 02:20

## 2019-09-03 RX ADMIN — METHYLPREDNISOLONE SODIUM SUCCINATE SCH MG: 125 INJECTION, POWDER, FOR SOLUTION INTRAMUSCULAR; INTRAVENOUS at 22:16

## 2019-09-03 RX ADMIN — ACETAMINOPHEN PRN MG: 325 TABLET ORAL at 12:08

## 2019-09-03 NOTE — PN
Teaching Attending Note


Name of Resident: Indra Gilmore





ATTENDING PHYSICIAN STATEMENT





I saw and evaluated the patient.


I reviewed the resident's note and discussed the case with the resident.


I agree with the resident's findings and plan as documented with exceptions 

below.








SUBJECTIVE:


patient seen and examined. variable pains and reports anxiety. states has been 

on cymbalta, nortriptyline in the past. Wondering if can be resumed on the 

same. 





OBJECTIVE:


 Vital Signs











 Period  Temp  Pulse  Resp  BP Sys/Camacho  Pulse Ox


 


 Last 24 Hr  97.5 F-98.3 F  74-79  18-18  116-150/80-86  96








 Intake & Output











 08/31/19 09/01/19 09/02/19 09/03/19





 23:59 23:59 23:59 23:59


 


Intake Total 810 1610 800 400


 


Output Total   600 


 


Balance 810 1610 200 400


 


Weight 206 lb 6.4 oz   








GENERAL: lying in bed in no acute distress


HEENT: PERRL, EOMI


Neck: soft, supple, no JVD


Chest: CTAB, no rales or wheezing


Abdomen:soft, NT, ND


extremities: no edema


Neuro: AAOx3, PERRL, EOMI, facial symmetry, speech normal, tongue midline, 

power 5/5, decreased sensation over left forearm/hand and left leg region but 

improved exam per patient, sensation symmetric face bilaterally, gait not 

observed





 Laboratory Results - last 24 hr











  09/03/19 09/03/19





  05:40 05:40


 


WBC  8.3 


 


RBC  4.43 


 


Hgb  13.4 


 


Hct  39.2 


 


MCV  88.5 


 


MCH  30.2 


 


MCHC  34.1 


 


RDW  13.0 


 


Plt Count  271 


 


MPV  7.1 L 


 


Sodium   141


 


Potassium   3.8


 


Chloride   107


 


Carbon Dioxide   29


 


Anion Gap   4 L


 


BUN   23.4 H


 


Creatinine   1.0


 


Est GFR (CKD-EPI)AfAm   104.88


 


Est GFR (CKD-EPI)NonAf   90.49


 


Random Glucose   126 H


 


Calcium   8.6














ASSESSMENT AND PLAN:


44 yom with PMHx of Multiple Sclerosis (2002) prior on tecfidera, recently lost 

to follow up sent by Dr. Edwards with concerns for MS flare 





-Multiple sclerosis exacerbation, progression vs relapse, vs Neuromyelitis 

Optica spectrum Disorder


-Low back pain


-Depression/anxiety


-GERD





Plan:


Neurology input noted.


Solumedrol 250 mg IV q6h x 5 days. 


MRI brain/C-spine noted. Follow up MRI T/LS spine. 


Follow up JCV titers, NMO Ab, TSH, CLARENCE, ESR, lyme


patient with ongoing anxiety/psych concerns, could be worsened from current 

high dose steroids. 


Dr. No consult. reconcile prior meds with [facility].


Resume accordingly. 


PPI/Maalox


DVTPPx Lovenox


PT eval


Outaptient follow up for immunomodulator. 


Dispo home pending clinical improvement. 


Discussed with patient and nursing.

## 2019-09-03 NOTE — PN
Progress Note (short form)





- Note


Progress Note: 


44 year old man with a PMH of MS (was on tecfidera, intermittent compliance for 

the past 6 months), who was sent by me first time yesetrday in office  for 

further management for MS. The patient reports hes been having 6 months of 

worsening blurry vision, weakness, lower extremity numbness-tingling, and 

urinary incontinence.  Patient  moved to [place] about 6 months ago from [place] 

and hasd been on Tecfidera sincve 2013 but ran out of RX apx 2 months ago.  

Denies fever, chills, nausea, vomiting, diarrhea, dysuria or hematuria. Denies 

alcohol abuse or use of illicit drugs. Nonsmoker.  ++ walking disability, and 

now needs cane. 





FU: 


anxious last night 


pain Left hip


+ numbness


some improvement on steroids


MRI reviewed: white matter disease periventricular; MRI C pine diffuse white 

matter signal abnl spanning multiple segments --more suggestive of NMO spectrum 

DO than multiple SClerosis 





MRI : 


Impression: Extensive demyelinating disease of the upper, mid and lower 

cervical cord also upper thoracic cord with no evidence of enhancing active 

demyelinating plaques. No evidence of cervical disc herniation or cord 

compression. 


Impression: Extensive demyelinating disease both cerebral hemispheres prominent 

centrum semiovale. No evidence acute infarct No suspicious enhancing active 

demyelinating plaques. No evidence acute intracerebral hemorrhage, subdural 

fluid collection or hydrocephalus. Large left maxillary sinus submucosal 

retention cyst sequela of sinusitis. Please note no prior MRI of the brain 

available for comparison when available an addendum will be dictated.. 











- Alcohol/Substance Use














Hx Alcohol Use: No





- Smoking History


Smoking history: Never smoked


Have you smoked in the past 12 months: No





Home Medications





- Allergies


Allergies/Adverse Reactions: 


 Allergies











Allergy/AdvReac Type Severity Reaction Status Date / Time


 


No Known Allergies Allergy   Verified 08/30/19 20:42














Physical Exam-Neuro


Vital Signs: 


  


 Vital Signs











Temperature  97.8 F   09/03/19 13:31


 


Pulse Rate  74   09/03/19 13:31


 


Respiratory Rate  18   09/03/19 13:31


 


Blood Pressure  140/81   09/03/19 13:31


 


O2 Sat by Pulse Oximetry (%)  95   09/03/19 09:00

















Constitutional: Yes: Well Nourished, No Distress


Labs: 


 CBCD











WBC  4.8 K/mm3 (4.0-10.0)   08/31/19  06:20    


 


RBC  4.89 M/mm3 (4.00-5.60)   08/31/19  06:20    


 


Hgb  14.8 GM/dL (11.7-16.9)   08/31/19  06:20    


 


Hct  43.6 % (35.4-49)   08/31/19  06:20    


 


MCV  89.2 fl (80-96)   08/31/19  06:20    


 


MCHC  34.0 g/dl (32.0-35.9)   08/31/19  06:20    


 


RDW  12.8 % (11.9-15.9)   08/31/19  06:20    


 


Plt Count  293 K/MM3 (134-434)   08/31/19  06:20    


 


MPV  7.1 fl (7.5-11.1)  L D 08/31/19  06:20    








 CMP











Sodium  138 mmol/L (136-145)   08/31/19  06:20    


 


Potassium  4.1 mmol/L (3.5-5.1)   08/31/19  06:20    


 


Chloride  104 mmol/L ()   08/31/19  06:20    


 


Carbon Dioxide  28 mmol/L (21-32)   08/31/19  06:20    


 


Anion Gap  5 MMOL/L (8-16)  L  08/31/19  06:20    


 


BUN  13.6 mg/dL (7-18)   08/31/19  06:20    


 


Creatinine  1.1 mg/dL (0.55-1.3)   08/31/19  06:20    


 


Calcium  9.4 mg/dL (8.5-10.1)   08/31/19  06:20    


 


Total Bilirubin  0.9 mg/dL (0.2-1)   08/31/19  06:20    


 


AST  19 U/L (15-37)   08/31/19  06:20    


 


ALT  45 U/L (13-61)   08/31/19  06:20    


 


Alkaline Phosphatase  94 U/L ()   08/31/19  06:20    


 


Total Protein  7.0 g/dl (6.4-8.2)   08/31/19  06:20    


 


Albumin  4.2 g/dl (3.4-5.0)   08/31/19  06:20    

















- Neuro Exam


Level Of Consciousness: Yes: Alert, Oriented to Person (EOmi, no facial, motor 5

/5, inc tone in legs, L >R, brisk reflexes , and requires assitance when he 

walks )





Problem List





- Problems


(1) Multiple sclerosis exacerbation


Code(s): G35 - MULTIPLE SCLEROSIS   





(2) Gait abnormality


Code(s): R26.9 - UNSPECIFIED ABNORMALITIES OF GAIT AND MOBILITY   





Assessment/Plan


44 year old man with a PMH of MS (was on tecfidera, intermittent compliance for 

the past 6 months), who was sent by me first time yesetrday in office  for 

further management for MS. The patient reports hes been having 6 months of 

worsening blurry vision, weakness, lower extremity numbness-tingling, and 

urinary incontinence.  Patient  moved to [place] about 6 months ago from [place] 

and hasd been on Tecfidera sincve 2013 but ran out of RX apx 2 months ago.  

Denies fever, chills, nausea, vomiting, diarrhea, dysuria or hematuria. Denies 

alcohol abuse or use of illicit drugs. Nonsmoker.  ++ walking disability, and 

now needs cane. 





AP : 


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 


started on SOlumedrol IV 250IV q6 x 5 days 


MRI BRAIN, C , T spine with JASON--multifocal diffuse signal chnages in MRI C 

spine more suggestive of NMO disease 





await T spine and get MRI LS SPINE --r/o DJD factors for low back pain , 

premeditate with valium , also check XRAY LEFT HIP 





cont  gabapentin 600BID , add pamelor 50 mg poqd, tramadol for pain 





JCV titers, NMO AB, TSH, CLARENCE , ESR, LYme --P 


rehab 


as out pt will decide which immunomodulator to restart Ocreveus vs Mayzent vs 

Tyzabri 








DR MARRERO 











Problem List





- Problems


(1) Multiple sclerosis exacerbation


Code(s): G35 - MULTIPLE SCLEROSIS   





(2) Gait abnormality


Code(s): R26.9 - UNSPECIFIED ABNORMALITIES OF GAIT AND MOBILITY

## 2019-09-03 NOTE — CON.PSL
Psychology Consult


Consult Specialty:: Neuropsychology


History Provided By: Patient


Limitations to Obtaining History: No Limitations


Current Medications: 


Active Medications





Acetaminophen (Tylenol -)  650 mg PO Q6H PRN


   PRN Reason: HEADACHE


   Last Admin: 19 12:08 Dose:  650 mg


Al Hydroxide/Mg Hydroxide (Mylanta Oral Suspension -)  30 ml PO Q6H PRN


   PRN Reason: DYSPEPSIA


   Last Admin: 19 19:31 Dose:  30 ml


Enoxaparin Sodium (Lovenox -)  40 mg SQ DAILY [facility]


   Last Admin: 19 09:42 Dose:  40 mg


Gabapentin (Neurontin -)  600 mg PO BID [facility]


   Last Admin: 19 09:41 Dose:  600 mg


Lorazepam (Ativan Injection -)  0.25 mg IVPUSH ONCE ONE


   Stop: 19 11:59


Melatonin (Melatonin)  3 mg PO HS PRN


   PRN Reason: INSOMNIA


   Last Admin: 19 00:09 Dose:  3 mg


Methylprednisolone Sodium Succinate (Solu-Medrol -)  250 mg IVPB Q6H-IV [facility]


   Last Admin: 19 14:50 Dose:  250 mg


Pantoprazole Sodium (Protonix -)  40 mg PO DAILY [facility]


   Last Admin: 19 09:41 Dose:  40 mg








Allergies: 


 Allergies











Allergy/AdvReac Type Severity Reaction Status Date / Time


 


No Known Allergies Allergy   Verified 19 20:42











Does patient have pain?: Yes


Pain Location Body Site: Back


Pain Intensity: 10


Hx Alcohol Use: Yes (He states that he no onger will drink but has in the past 

n occasion.)


Hx Substance Use: Yes (Marijuana was prescribed in [place] for his condition.

)





Current Medical Exam-Psy


Orientation: Time, Person, Place


Immediate Term Memory: 3/3


Expressive: Coherent


Receptive: Age Appropriate Comprehension of Spoken Words


Hallucinations: Absent


Thought Process: Intact


Depression: Moderate


Hopelessness: Yes (His experiences hopelessness at times but draws himself out 

of it.)


Loss of Interest: No


Anxiety Level: Moderate


Danger to Self and Others: No


Sleep: Poorly


Appetite: Good


Serial Sevens Intact: No


Repeats 3 words told earlier: 1/3


Support System: None (He recently lost his mom who he was attending to during 

her terminal illness. His father and older brother had  (his brother  

at age 19 from injuries).)





Problem List





- Problem


(1) Anxiety about health


Code(s): F41.8 - OTHER SPECIFIED ANXIETY DISORDERS   





(2) Depression


Code(s): F32.9 - MAJOR DEPRESSIVE DISORDER, SINGLE EPISODE, UNSPECIFIED   


Qualifiers: 


   Major depression recurrence: single episode   Active/Remission status: 

currently active   Major depression episode severity: moderate 





Assessment/Plan





The patient is a pleasant man who has experienced significant losses ( his 

parents, brother). His own diagnosis which according  to the patient may have 

been wrong is also a major contributing factor to his mood and anxiety. He 

stated that he was diagnosed initially with MS but was advised that it may be 

another neurological condition. He experiences significant pain, has difficulty 

ambulating, experiences unsteadiness when ambulating, anxiety over his 

condition and depression over his losses as well. Hypnosis for pain and 

psychotherapy will be administered to the patient.








Treatment will be provided while he remains at this facility.

## 2019-09-04 LAB
ANION GAP SERPL CALC-SCNC: 10 MMOL/L (ref 8–16)
BASOPHILS # BLD: 0 % (ref 0–2)
BUN SERPL-MCNC: 18.6 MG/DL (ref 7–18)
CALCIUM SERPL-MCNC: 8.8 MG/DL (ref 8.5–10.1)
CHLORIDE SERPL-SCNC: 101 MMOL/L (ref 98–107)
CO2 SERPL-SCNC: 31 MMOL/L (ref 21–32)
CREAT SERPL-MCNC: 1 MG/DL (ref 0.55–1.3)
DEPRECATED RDW RBC AUTO: 13 % (ref 11.9–15.9)
EOSINOPHIL # BLD: 0 % (ref 0–4.5)
GLUCOSE SERPL-MCNC: 122 MG/DL (ref 74–106)
HCT VFR BLD CALC: 43.7 % (ref 35.4–49)
HGB BLD-MCNC: 15.1 GM/DL (ref 11.7–16.9)
LYMPHOCYTES # BLD: 8 % (ref 8–40)
MCH RBC QN AUTO: 30.6 PG (ref 25.7–33.7)
MCHC RBC AUTO-ENTMCNC: 34.5 G/DL (ref 32–35.9)
MCV RBC: 88.7 FL (ref 80–96)
MONOCYTES # BLD AUTO: 2.7 % (ref 3.8–10.2)
NEUTROPHILS # BLD: 89.3 % (ref 42.8–82.8)
PLATELET # BLD AUTO: 280 K/MM3 (ref 134–434)
PMV BLD: 7.5 FL (ref 7.5–11.1)
POTASSIUM SERPLBLD-SCNC: 4 MMOL/L (ref 3.5–5.1)
RBC # BLD AUTO: 4.93 M/MM3 (ref 4–5.6)
SODIUM SERPL-SCNC: 142 MMOL/L (ref 136–145)
WBC # BLD AUTO: 8.7 K/MM3 (ref 4–10)

## 2019-09-04 RX ADMIN — Medication PRN MG: at 22:15

## 2019-09-04 RX ADMIN — METHYLPREDNISOLONE SODIUM SUCCINATE SCH MG: 125 INJECTION, POWDER, FOR SOLUTION INTRAMUSCULAR; INTRAVENOUS at 14:56

## 2019-09-04 RX ADMIN — DOCUSATE SODIUM SCH MG: 100 CAPSULE, LIQUID FILLED ORAL at 22:07

## 2019-09-04 RX ADMIN — NORTRIPTYLINE HYDROCHLORIDE SCH MG: 25 CAPSULE ORAL at 09:25

## 2019-09-04 RX ADMIN — POLYETHYLENE GLYCOL 3350 SCH GRAMS: 17 POWDER, FOR SOLUTION ORAL at 10:49

## 2019-09-04 RX ADMIN — PANTOPRAZOLE SODIUM SCH MG: 40 TABLET, DELAYED RELEASE ORAL at 09:24

## 2019-09-04 RX ADMIN — POLYETHYLENE GLYCOL 3350 SCH GRAMS: 17 POWDER, FOR SOLUTION ORAL at 22:07

## 2019-09-04 RX ADMIN — METHYLPREDNISOLONE SODIUM SUCCINATE SCH MG: 125 INJECTION, POWDER, FOR SOLUTION INTRAMUSCULAR; INTRAVENOUS at 04:00

## 2019-09-04 RX ADMIN — METHYLPREDNISOLONE SODIUM SUCCINATE SCH MG: 125 INJECTION, POWDER, FOR SOLUTION INTRAMUSCULAR; INTRAVENOUS at 09:24

## 2019-09-04 RX ADMIN — GABAPENTIN SCH MG: 300 CAPSULE ORAL at 09:24

## 2019-09-04 RX ADMIN — ENOXAPARIN SODIUM SCH MG: 40 INJECTION SUBCUTANEOUS at 09:24

## 2019-09-04 RX ADMIN — GABAPENTIN SCH MG: 300 CAPSULE ORAL at 22:07

## 2019-09-04 RX ADMIN — DOCUSATE SODIUM SCH MG: 100 CAPSULE, LIQUID FILLED ORAL at 14:56

## 2019-09-04 NOTE — PN
Physical Exam: 


SUBJECTIVE: atient seen and examined at bedside.  Improving.











OBJECTIVE:





 Vital Signs











 Period  Temp  Pulse  Resp  BP Sys/Camacho  Pulse Ox


 


 Last 24 Hr  97.5 F-98.9 F  66-92  18-20  111-148/69-97  99











GEN: NAD, AAOx3


HEENT: NCAT, EOMI


Neck: supple, no jvd


Cardio: RRR, normal s1s2, no mrg


Pulm: cta b/l


Abd: soft, nontender, nondistended


Ext: 2+ pulses, no edema











 Laboratory Results - last 24 hr











  09/02/19 09/04/19 09/04/19





  06:18 06:30 06:30


 


WBC   8.7 


 


RBC   4.93 


 


Hgb   15.1 


 


Hct   43.7 


 


MCV   88.7 


 


MCH   30.6 


 


MCHC   34.5 


 


RDW   13.0 


 


Plt Count   280 


 


MPV   7.5 


 


Absolute Neuts (auto)   7.8 


 


Neutrophils %   89.3 H 


 


Lymphocytes %   8.0  D 


 


Monocytes %   2.7 L D 


 


Eosinophils %   0.0 


 


Basophils %   0.0 


 


Nucleated RBC %   0 


 


Sodium    142


 


Potassium    4.0


 


Chloride    101


 


Carbon Dioxide    31


 


Anion Gap    10


 


BUN    18.6 H


 


Creatinine    1.0


 


Est GFR (CKD-EPI)AfAm    104.88


 


Est GFR (CKD-EPI)NonAf    90.49


 


Random Glucose    122 H


 


Calcium    8.8


 


TSH    0.24 L


 


Free T4    0.83


 


Neuromyelitis Optica Ab  < 2  








Active Medications











Generic Name Dose Route Start Last Admin





  Trade Name Freq  PRN Reason Stop Dose Admin


 


Acetaminophen  650 mg  08/31/19 14:43  09/03/19 12:08





  Tylenol -  PO   650 mg





  Q6H PRN   Administration





  HEADACHE   





     





     





     


 


Al Hydroxide/Mg Hydroxide  30 ml  09/01/19 10:03  09/01/19 19:31





  Mylanta Oral Suspension -  PO   30 ml





  Q6H PRN   Administration





  DYSPEPSIA   





     





     





     


 


Docusate Sodium  100 mg  09/04/19 14:00  09/04/19 14:56





  Colace -  PO   100 mg





  TID LUBNA   Administration





     





     





     





     


 


Enoxaparin Sodium  40 mg  08/31/19 10:00  09/04/19 09:24





  Lovenox -  SQ   40 mg





  DAILY LUBNA   Administration





     





     





     





     


 


Gabapentin  600 mg  09/03/19 10:00  09/04/19 09:24





  Neurontin -  PO   600 mg





  BID LUBNA   Administration





     





     





     





     


 


Lorazepam  0.25 mg  09/02/19 11:58  





  Ativan Injection -  IVPUSH  09/02/19 11:59  





  ONCE ONE   





     





     





     





     


 


Melatonin  3 mg  08/31/19 04:04  09/03/19 22:56





  Melatonin  PO   3 mg





  HS PRN   Administration





  INSOMNIA   





     





     





     


 


Nortriptyline HCl  50 mg  09/03/19 17:30  09/04/19 09:25





  Pamelor -  PO   50 mg





  DAILY LUBNA   Administration





     





     





     





     


 


Pantoprazole Sodium  40 mg  09/01/19 10:00  09/04/19 09:24





  Protonix -  PO   40 mg





  DAILY LUBNA   Administration





     





     





     





     


 


Polyethylene Glycol  17 gm  09/04/19 10:15  09/04/19 10:49





  Miralax (For Daily Use) -  PO   17 grams





  BID LUBNA   Administration





     





     





     





     


 


Tramadol HCl  50 mg  09/03/19 16:24  09/04/19 18:37





  Ultram -  PO   50 mg





  Q8H PRN   Administration





  PAIN LEVEL 4 - 6   





     





     





     











ASSESSMENT/PLAN:


44M with hx of MS diagnosis in 2011, presenting with worsening blurry vision, 

imbalance, weakness, urinary incontinence/retention. Recently moved from 

[place] and established care with Dr. Edwards (neurologist) today. 





# MS/NMO Flare


- Neuro on board, Dr. Edwards


- Neuro checks q6h


- Brain MRI and Cervical / Thoracic spine MRI with contrast showing extensive 

lesions 


-Neuromyelitis Optica screen pending


-Lyme pending





#Insomnia


-Melatonin qHS for sleep 








#FEN


- PO fluids


- replete lytes PRN


- regular diet


#PPx


- Lovenox 40mg SQ daily, encourage ambulation








# Dispo: admit to med-surg . Pending SNF











Visit type





- Emergency Visit


Emergency Visit: No





- New Patient


This patient is new to me today: No





- Critical Care


Critical Care patient: No





ATTENDING PHYSICIAN STATEMENT





I saw and evaluated the patient.


I reviewed the resident's note and discussed the case with the resident.


I agree with the resident's findings and plan as documented.








SUBJECTIVE:








OBJECTIVE:








ASSESSMENT AND PLAN:

## 2019-09-04 NOTE — PN
Teaching Attending Note


Name of Resident: Indra Gilmore





ATTENDING PHYSICIAN STATEMENT





I saw and evaluated the patient.


I reviewed the resident's note and discussed the case with the resident.


I agree with the resident's findings and plan as documented.








Seen and examined; please refer to resident note for further historical 

information.  Briefly, this is a 43 y/o male with MS (intermittent compliance 

with no Rx x2 months for tecfidera).  He is completing his course of IV 

steroids today.  Continues to improve.  Needs rehab referral. 





VS, labs, imaging reviewed


NAD, AAO, resting in bed


NC AT EOMI PERRLA


RRR s1/2


Moves all 4 extremities, fluid speech, no distotions in sensorium





XR L-hip negative for fx


MRI L and T spine reviewed; mutliple levels with demyelation without active 

issues.  


NMO Ab is pending; CLAERNCE, ESR negative.  TSH pending





ASSESSMENT AND PLAN:


Patient with history of MS complicated by intermittent compiance with tecfidera

; completing steroids, following up NMO Ab.  Symptoms improved; discussing 

disposition with neurology.  





-MS (Completing IV steroids, reviewed imaging.  Discussing dispo with neuro.  

Rehav eval.  Will need close followup as OP to decide on immunomodulators (

ocreveus vs. mayzent vs. tyzabri).  Following up CARLY serology, NMA Ab, TSH.  ESR 

and CLARENCE negative.  Appreciate neuro input). 


-Chronic Pain (Continue gabapentin 600 BID,  pamelor 50 mg qd, tramadol; 

controlled)


-Physical Deconditioning (Rehab eval)


-Neutrophilia (likely 2/2 steroids)

## 2019-09-04 NOTE — PN
Progress Note (short form)





- Note


Progress Note: 


44 year old man with a PMH of MS (was on tecfidera, intermittent compliance for 

the past 6 months), who was sent by me first time yesetrday in office  for 

further management for MS. The patient reports hes been having 6 months of 

worsening blurry vision, weakness, lower extremity numbness-tingling, and 

urinary incontinence.  Patient  moved to [place] about 6 months ago from [place] 

and hasd been on Tecfidera sincve 2013 but ran out of RX apx 2 months ago.  

Denies fever, chills, nausea, vomiting, diarrhea, dysuria or hematuria. Denies 

alcohol abuse or use of illicit drugs. Nonsmoker.  ++ walking disability, and 

now needs cane. 





FU: 





pain Left hip , XRAY (-) 


+ numbness


some improvement on steroids, to complete today 








on exam : hemiplegiac gait L >R, with in tone , hyperflexia 








MRI reviewed: white matter disease periventricular; MRI C pine diffuse white 

matter signal abnl spanning multiple segments --more suggestive of NMO spectrum 

DO than multiple SClerosis 


MRI T SPINE --multilevel plaques


MRI LS spine WNL 





MRI : 


Impression: Extensive demyelinating disease of the upper, mid and lower 

cervical cord also upper thoracic cord with no evidence of enhancing active 

demyelinating plaques. No evidence of cervical disc herniation or cord 

compression. 


Impression: Extensive demyelinating disease both cerebral hemispheres prominent 

centrum semiovale. No evidence acute infarct No suspicious enhancing active 

demyelinating plaques. No evidence acute intracerebral hemorrhage, subdural 

fluid collection or hydrocephalus. Large left maxillary sinus submucosal 

retention cyst sequela of sinusitis. Please note no prior MRI of the brain 

available for comparison when available an addendum will be dictated.. 














- Alcohol/Substance Use














Hx Alcohol Use: No





- Smoking History


Smoking history: Never smoked


Have you smoked in the past 12 months: No





Home Medications





- Allergies


Allergies/Adverse Reactions: 


 Allergies











Allergy/AdvReac Type Severity Reaction Status Date / Time


 


No Known Allergies Allergy   Verified 08/30/19 20:42














Physical Exam-Neuro


Vital Signs: 


  


  Vital Signs











Temperature  97.5 F L  09/04/19 06:00


 


Pulse Rate  66   09/04/19 06:00


 


Respiratory Rate  18   09/04/19 06:00


 


Blood Pressure  111/69   09/04/19 06:00


 


O2 Sat by Pulse Oximetry (%)  99   09/03/19 21:00




















Constitutional: Yes: Well Nourished, No Distress


Labs: 


  CBCD











WBC  8.7 K/mm3 (4.0-10.0)   09/04/19  06:30    


 


RBC  4.93 M/mm3 (4.00-5.60)   09/04/19  06:30    


 


Hgb  15.1 GM/dL (11.7-16.9)   09/04/19  06:30    


 


Hct  43.7 % (35.4-49)   09/04/19  06:30    


 


MCV  88.7 fl (80-96)   09/04/19  06:30    


 


MCHC  34.5 g/dl (32.0-35.9)   09/04/19  06:30    


 


RDW  13.0 % (11.9-15.9)   09/04/19  06:30    


 


Plt Count  280 K/MM3 (134-434)   09/04/19  06:30    


 


MPV  7.5 fl (7.5-11.1)   09/04/19  06:30    








 CMP











Sodium  142 mmol/L (136-145)   09/04/19  06:30    


 


Potassium  4.0 mmol/L (3.5-5.1)   09/04/19  06:30    


 


Chloride  101 mmol/L ()   09/04/19  06:30    


 


Carbon Dioxide  31 mmol/L (21-32)   09/04/19  06:30    


 


Anion Gap  10 MMOL/L (8-16)   09/04/19  06:30    


 


BUN  18.6 mg/dL (7-18)  H  09/04/19  06:30    


 


Creatinine  1.0 mg/dL (0.55-1.3)   09/04/19  06:30    


 


Calcium  8.8 mg/dL (8.5-10.1)   09/04/19  06:30    


 


Total Bilirubin  0.9 mg/dL (0.2-1)   08/31/19  06:20    


 


AST  19 U/L (15-37)   08/31/19  06:20    


 


ALT  45 U/L (13-61)   08/31/19  06:20    


 


Alkaline Phosphatase  94 U/L ()   08/31/19  06:20    


 


Total Protein  7.0 g/dl (6.4-8.2)   08/31/19  06:20    


 


Albumin  4.2 g/dl (3.4-5.0)   08/31/19  06:20    














- Neuro Exam


Level Of Consciousness: Yes: Alert, Oriented to Person (EOmi, no facial, motor 5

/5, inc tone in legs, L >R, brisk reflexes , and requires assitance when he 

walks )





Problem List





- Problems


(1) Multiple sclerosis exacerbation


Code(s): G35 - MULTIPLE SCLEROSIS   





(2) Gait abnormality


Code(s): R26.9 - UNSPECIFIED ABNORMALITIES OF GAIT AND MOBILITY   





Assessment/Plan


44 year old man with a PMH of MS (was on tecfidera, intermittent compliance for 

the past 6 months), who was sent by me first time yesetrday in office  for 

further management for MS. The patient reports hes been having 6 months of 

worsening blurry vision, weakness, lower extremity numbness-tingling, and 

urinary incontinence.  Patient  moved to [place] about 6 months ago from [place] 

and hasd been on Tecfidera sincve 2013 but ran out of RX apx 2 months ago.  

Denies fever, chills, nausea, vomiting, diarrhea, dysuria or hematuria. Denies 

alcohol abuse or use of illicit drugs. Nonsmoker.  ++ walking disability, and 

now needs cane. 





AP : 


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 


started on SOlumedrol IV 250IV q6 x 5 days --complete today , continues to have 

hemiplegic gait and unsteady 





MRI BRAIN, C , T spine with JASON--multifocal diffuse signal chnages in MRI C 

spine more suggestive of NMO disease 





cont  gabapentin 600BID , cont pamelor 50 mg poqd, tramadol for pain 





JCV titers, NMO AB, TSH, CLARENCE , ESR, LYme --P 








will need rehab , discharge planner aware 





as out pt will decide which immunomodulator to restart Ocreveus vs Mayzent vs 

Tyzabri 








DR MARRERO 














Problem List





- Problems


(1) Multiple sclerosis exacerbation


Code(s): G35 - MULTIPLE SCLEROSIS   





(2) Gait abnormality


Code(s): R26.9 - UNSPECIFIED ABNORMALITIES OF GAIT AND MOBILITY

## 2019-09-05 LAB
ANION GAP SERPL CALC-SCNC: 10 MMOL/L (ref 8–16)
BUN SERPL-MCNC: 18.2 MG/DL (ref 7–18)
CALCIUM SERPL-MCNC: 8.3 MG/DL (ref 8.5–10.1)
CHLORIDE SERPL-SCNC: 102 MMOL/L (ref 98–107)
CO2 SERPL-SCNC: 29 MMOL/L (ref 21–32)
CREAT SERPL-MCNC: 0.9 MG/DL (ref 0.55–1.3)
DEPRECATED RDW RBC AUTO: 12.9 % (ref 11.9–15.9)
GLUCOSE SERPL-MCNC: 119 MG/DL (ref 74–106)
HCT VFR BLD CALC: 41.4 % (ref 35.4–49)
HGB BLD-MCNC: 14.3 GM/DL (ref 11.7–16.9)
MCH RBC QN AUTO: 30.6 PG (ref 25.7–33.7)
MCHC RBC AUTO-ENTMCNC: 34.6 G/DL (ref 32–35.9)
MCV RBC: 88.5 FL (ref 80–96)
PLATELET # BLD AUTO: 279 K/MM3 (ref 134–434)
PMV BLD: 7.1 FL (ref 7.5–11.1)
POTASSIUM SERPLBLD-SCNC: 4 MMOL/L (ref 3.5–5.1)
RBC # BLD AUTO: 4.68 M/MM3 (ref 4–5.6)
SODIUM SERPL-SCNC: 141 MMOL/L (ref 136–145)
WBC # BLD AUTO: 10.4 K/MM3 (ref 4–10)

## 2019-09-05 RX ADMIN — POLYETHYLENE GLYCOL 3350 SCH GRAMS: 17 POWDER, FOR SOLUTION ORAL at 10:25

## 2019-09-05 RX ADMIN — PANTOPRAZOLE SODIUM SCH MG: 40 TABLET, DELAYED RELEASE ORAL at 10:24

## 2019-09-05 RX ADMIN — DOCUSATE SODIUM SCH: 100 CAPSULE, LIQUID FILLED ORAL at 23:00

## 2019-09-05 RX ADMIN — ACETAMINOPHEN PRN MG: 325 TABLET ORAL at 23:17

## 2019-09-05 RX ADMIN — NORTRIPTYLINE HYDROCHLORIDE SCH MG: 25 CAPSULE ORAL at 10:24

## 2019-09-05 RX ADMIN — GABAPENTIN SCH MG: 300 CAPSULE ORAL at 23:00

## 2019-09-05 RX ADMIN — POLYETHYLENE GLYCOL 3350 SCH: 17 POWDER, FOR SOLUTION ORAL at 23:17

## 2019-09-05 RX ADMIN — DOCUSATE SODIUM SCH MG: 100 CAPSULE, LIQUID FILLED ORAL at 14:10

## 2019-09-05 RX ADMIN — DOCUSATE SODIUM SCH MG: 100 CAPSULE, LIQUID FILLED ORAL at 06:45

## 2019-09-05 RX ADMIN — GABAPENTIN SCH MG: 300 CAPSULE ORAL at 10:24

## 2019-09-05 RX ADMIN — ENOXAPARIN SODIUM SCH MG: 40 INJECTION SUBCUTANEOUS at 10:24

## 2019-09-05 NOTE — PN
Progress Note (short form)





- Note


Progress Note: 





Scooter was troubled by his peripheral neuropathic symptoms and pain. He seemed 

anxious about the possible diagnosis of a neurological condition that is 

progressive and potentially lethal. His MS is also a major concern. In addition

, we talked about the losses of family members and his mom in particular. A 

deep diaphragmatic breathing technique was taught to the patient. In addition, 

Cognitive Behavioral intervention was used with him and he was advised to use 

these approaches for his anxiety and depression.





Problem List





- Problems


(1) Anxiety about health


Code(s): F41.8 - OTHER SPECIFIED ANXIETY DISORDERS   





(2) Depression


Code(s): F32.9 - MAJOR DEPRESSIVE DISORDER, SINGLE EPISODE, UNSPECIFIED   


Qualifiers: 


   Major depression recurrence: single episode   Active/Remission status: 

currently active   Major depression episode severity: moderate

## 2019-09-05 NOTE — PN
Physical Exam: 


SUBJECTIVE: Patient seen and examined at bedside. Spoke with SANDRA GONZALEZ.








OBJECTIVE:





 Vital Signs











 Period  Temp  Pulse  Resp  BP Sys/Camacho  Pulse Ox


 


 Last 24 Hr  97.6 F-98.2 F  81-90  16-18  102-149/  100











GEN: NAD, AAOx3


HEENT: NCAT, EOMI


Neck: supple, no jvd


Cardio: RRR, normal s1s2, no mrg


Pulm: cta b/l


Abd: soft, nontender, nondistended


Ext: 2+ pulses, no edema














 Laboratory Results - last 24 hr











  09/01/19 09/02/19 09/05/19





  11:20 06:18 06:45


 


WBC    10.4 H


 


RBC    4.68


 


Hgb    14.3


 


Hct    41.4


 


MCV    88.5


 


MCH    30.6


 


MCHC    34.6


 


RDW    12.9


 


Plt Count    279


 


MPV    7.1 L


 


Sodium   


 


Potassium   


 


Chloride   


 


Carbon Dioxide   


 


Anion Gap   


 


BUN   


 


Creatinine   


 


Est GFR (CKD-EPI)AfAm   


 


Est GFR (CKD-EPI)NonAf   


 


Random Glucose   


 


Calcium   


 


Lyme IgM 23 kDa Band   No Result Required. 


 


Lyme IgM 39 kDa Band   No Result Required. 


 


Lyme IgM 41 kDa Band   No Result Required. 


 


CARLY Virus DNA (PCR)  Negative  














  09/05/19





  06:45


 


WBC 


 


RBC 


 


Hgb 


 


Hct 


 


MCV 


 


MCH 


 


MCHC 


 


RDW 


 


Plt Count 


 


MPV 


 


Sodium  141


 


Potassium  4.0


 


Chloride  102


 


Carbon Dioxide  29


 


Anion Gap  10


 


BUN  18.2 H


 


Creatinine  0.9


 


Est GFR (CKD-EPI)AfAm  119.13


 


Est GFR (CKD-EPI)NonAf  102.79


 


Random Glucose  119 H


 


Calcium  8.3 L


 


Lyme IgM 23 kDa Band 


 


Lyme IgM 39 kDa Band 


 


Lyme IgM 41 kDa Band 


 


CARLY Virus DNA (PCR) 








Active Medications











Generic Name Dose Route Start Last Admin





  Trade Name Freq  PRN Reason Stop Dose Admin


 


Acetaminophen  650 mg  08/31/19 14:43  09/03/19 12:08





  Tylenol -  PO   650 mg





  Q6H PRN   Administration





  HEADACHE   





     





     





     


 


Al Hydroxide/Mg Hydroxide  30 ml  09/01/19 10:03  09/01/19 19:31





  Mylanta Oral Suspension -  PO   30 ml





  Q6H PRN   Administration





  DYSPEPSIA   





     





     





     


 


Docusate Sodium  100 mg  09/04/19 14:00  09/05/19 14:10





  Colace -  PO   100 mg





  TID LUBNA   Administration





     





     





     





     


 


Enoxaparin Sodium  40 mg  08/31/19 10:00  09/05/19 10:24





  Lovenox -  SQ   40 mg





  DAILY LUBNA   Administration





     





     





     





     


 


Gabapentin  600 mg  09/03/19 10:00  09/05/19 10:24





  Neurontin -  PO   600 mg





  BID LUBNA   Administration





     





     





     





     


 


Lorazepam  0.25 mg  09/02/19 11:58  





  Ativan Injection -  IVPUSH  09/02/19 11:59  





  ONCE ONE   





     





     





     





     


 


Melatonin  3 mg  08/31/19 04:04  09/04/19 22:15





  Melatonin  PO   3 mg





  HS PRN   Administration





  INSOMNIA   





     





     





     


 


Nortriptyline HCl  50 mg  09/03/19 17:30  09/05/19 10:24





  Pamelor -  PO   50 mg





  DAILY LUBNA   Administration





     





     





     





     


 


Pantoprazole Sodium  40 mg  09/01/19 10:00  09/05/19 10:24





  Protonix -  PO   40 mg





  DAILY LUBNA   Administration





     





     





     





     


 


Polyethylene Glycol  17 gm  09/04/19 10:15  09/05/19 10:25





  Miralax (For Daily Use) -  PO   17 grams





  BID LUBNA   Administration





     





     





     





     











ASSESSMENT/PLAN:


44M with hx of MS diagnosis in 2011, presenting with worsening blurry vision, 

imbalance, weakness, urinary incontinence/retention. Recently moved from 

[place] and established care with Dr. Edwards (neurologist) today. 











# MS/NMO Flare


- Neuro on board, Dr. Edwards


- Neuro checks q6h


- Brain MRI and Cervical / Thoracic spine MRI with contrast showing extensive 

lesions 


-Neuromyelitis Optica screen negative


-Lyme negative


-pt very anxious about sudden and severe pain associated with movement





#Insomnia


-Melatonin qHS for sleep 





#FEN


- PO fluids


- replete lytes PRN


- regular diet





#PPx


- Lovenox 40mg SQ daily, encourage ambulation





# Dispo: admit to med-surg . Pending SNF





Visit type





- Emergency Visit


Emergency Visit: No





- New Patient


This patient is new to me today: No





- Critical Care


Critical Care patient: No





ATTENDING PHYSICIAN STATEMENT





I saw and evaluated the patient.


I reviewed the resident's note and discussed the case with the resident.


I agree with the resident's findings and plan as documented.








SUBJECTIVE:








OBJECTIVE:








ASSESSMENT AND PLAN:

## 2019-09-05 NOTE — PN
Teaching Attending Note


Name of Resident: Indra Gilmore





ATTENDING PHYSICIAN STATEMENT





I saw and evaluated the patient.


I reviewed the resident's note and discussed the case with the resident.


I agree with the resident's findings and plan as documented.








Seen and examined; please refer to resident note for further historical 

information.  He is doing well today and pendign rehab; leukocytosis noted but 

no fevers and this is likely 2/2 steroids. 





VS, labs, imaging reviewed


NAD, AAO, resting in bed


NC AT EOMI PERRLA


RRR s1/2


Moves all 4 extremities, fluid speech, no distotions in sensorium


NT ND +BS


Normal mood, appropriate affect





XR L-hip negative for fx


MRI L and T spine reviewed


NMO Ab neg; lyme neg, CARLY neg





ASSESSMENT AND PLAN:


Patient with history of MS complicated by intermittent compiance with tecfidera

; completing steroids, following up NMO Ab.  Symptoms improved; discussing 

disposition with neurology.  





-MS (Completed IV steroid burst; pending rehab placement due to his profound 

deconditioning.  Will need close followup as OP to decide on immunomodulators (

ocreveus vs. mayzent vs. tyzabri).  Aformentioned serology noted negative.  

Appreciate neuro input). 


-Chronic Pain (Continue gabapentin 600 BID,  pamelor 50 mg qd, tramadol; 

controlled)


-Physical Deconditioning (Rehab)


-Neutrophilia (likely 2/2 steroids)





Followup:


-PCP 3-5 days (can arrange with resident clinic)


-Neuro within 1 week





Dispo: Rehab





45 mins spent preparing

## 2019-09-06 VITALS — TEMPERATURE: 98 F | SYSTOLIC BLOOD PRESSURE: 124 MMHG | DIASTOLIC BLOOD PRESSURE: 79 MMHG | HEART RATE: 97 BPM

## 2019-09-06 LAB
ANION GAP SERPL CALC-SCNC: 7 MMOL/L (ref 8–16)
BUN SERPL-MCNC: 19.6 MG/DL (ref 7–18)
CALCIUM SERPL-MCNC: 7.8 MG/DL (ref 8.5–10.1)
CHLORIDE SERPL-SCNC: 101 MMOL/L (ref 98–107)
CO2 SERPL-SCNC: 32 MMOL/L (ref 21–32)
CREAT SERPL-MCNC: 1.1 MG/DL (ref 0.55–1.3)
DEPRECATED RDW RBC AUTO: 12.8 % (ref 11.9–15.9)
GLUCOSE SERPL-MCNC: 80 MG/DL (ref 74–106)
HCT VFR BLD CALC: 41.7 % (ref 35.4–49)
HGB BLD-MCNC: 14.3 GM/DL (ref 11.7–16.9)
MCH RBC QN AUTO: 30.8 PG (ref 25.7–33.7)
MCHC RBC AUTO-ENTMCNC: 34.2 G/DL (ref 32–35.9)
MCV RBC: 90.1 FL (ref 80–96)
PLATELET # BLD AUTO: 237 K/MM3 (ref 134–434)
PMV BLD: 6.9 FL (ref 7.5–11.1)
POTASSIUM SERPLBLD-SCNC: 3.7 MMOL/L (ref 3.5–5.1)
RBC # BLD AUTO: 4.63 M/MM3 (ref 4–5.6)
SODIUM SERPL-SCNC: 141 MMOL/L (ref 136–145)
WBC # BLD AUTO: 5.9 K/MM3 (ref 4–10)

## 2019-09-06 RX ADMIN — PANTOPRAZOLE SODIUM SCH MG: 40 TABLET, DELAYED RELEASE ORAL at 09:49

## 2019-09-06 RX ADMIN — POLYETHYLENE GLYCOL 3350 SCH GRAMS: 17 POWDER, FOR SOLUTION ORAL at 09:50

## 2019-09-06 RX ADMIN — Medication PRN MG: at 01:00

## 2019-09-06 RX ADMIN — NORTRIPTYLINE HYDROCHLORIDE SCH MG: 25 CAPSULE ORAL at 09:49

## 2019-09-06 RX ADMIN — DOCUSATE SODIUM SCH MG: 100 CAPSULE, LIQUID FILLED ORAL at 05:43

## 2019-09-06 RX ADMIN — GABAPENTIN SCH MG: 300 CAPSULE ORAL at 09:49

## 2019-09-06 RX ADMIN — ENOXAPARIN SODIUM SCH MG: 40 INJECTION SUBCUTANEOUS at 09:49

## 2019-09-06 NOTE — PN
Teaching Attending Note


Name of Resident: Indra Deirdre





Patient was cleared for DC yesterday but transportation couldn't be arranged 

until today; he had no changes overnight and will be discharged home with 

neurological followup.





Exam unchanged





A/P: 


Discharging today to rehab





-MS (Completed IV steroid burst; pending rehab placement due to his profound 

deconditioning.  Will need close followup as OP to decide on immunomodulators (

ocreveus vs. mayzent vs. tyzabri).  Aformentioned serology noted negative.  

Appreciate neuro input). 


-Chronic Pain (Continue gabapentin 600 BID,  pamelor 50 mg qd, tramadol; 

controlled)


-Physical Deconditioning (Rehab)


-Neutrophilia (likely 2/2 steroids)





Followup:


-PCP 3-5 days (can arrange with resident clinic)


-Neuro within 1 week


-Consider ortho referral for chronic knee pain (tells me he had old meniscal 

injuries)





Dispo: Rehab





45 mins spent preparing DCS

## 2019-09-06 NOTE — PN
Progress Note (short form)





- Note


Progress Note: 


44 year old man with a PMH of MS (was on tecfidera, intermittent compliance for 

the past 6 months), who was sent by me first time yesetrday in office  for 

further management for MS. The patient reports hes been having 6 months of 

worsening blurry vision, weakness, lower extremity numbness-tingling, and 

urinary incontinence.  Patient  moved to [place] about 6 months ago from [place] 

and hasd been on Tecfidera sincve 2013 but ran out of RX apx 2 months ago.  

Denies fever, chills, nausea, vomiting, diarrhea, dysuria or hematuria. Denies 

alcohol abuse or use of illicit drugs. Nonsmoker.  ++ walking disability, and 

now needs cane. 





FU: 





plan for rehab later today 





pain Left hip , XRAY (-) 


+ numbness


some improvement on steroids, completed 





NMO (-), CARLY (-) 








on exam : hemiplegiac gait L >R, with in tone , hyperflexia 








MRI reviewed: white matter disease periventricular; MRI C pine diffuse white 

matter signal abnl spanning multiple segments --more suggestive of NMO spectrum 

DO than multiple SClerosis 


MRI T SPINE --multilevel plaques


MRI LS spine WNL 





MRI : 


Impression: Extensive demyelinating disease of the upper, mid and lower 

cervical cord also upper thoracic cord with no evidence of enhancing active 

demyelinating plaques. No evidence of cervical disc herniation or cord 

compression. 


Impression: Extensive demyelinating disease both cerebral hemispheres prominent 

centrum semiovale. No evidence acute infarct No suspicious enhancing active 

demyelinating plaques. No evidence acute intracerebral hemorrhage, subdural 

fluid collection or hydrocephalus. Large left maxillary sinus submucosal 

retention cyst sequela of sinusitis. Please note no prior MRI of the brain 

available for comparison when available an addendum will be dictated.. 














- Alcohol/Substance Use














Hx Alcohol Use: No





- Smoking History


Smoking history: Never smoked


Have you smoked in the past 12 months: No





Home Medications





- Allergies


Allergies/Adverse Reactions: 


 Allergies











Allergy/AdvReac Type Severity Reaction Status Date / Time


 


No Known Allergies Allergy   Verified 08/30/19 20:42














Physical Exam-Neuro


Vital Signs: 


  


 Vital Signs











Temperature  97.3 F L  09/06/19 06:00


 


Pulse Rate  62   09/06/19 06:00


 


Respiratory Rate  16   09/06/19 06:00


 


Blood Pressure  119/79   09/06/19 06:00


 


O2 Sat by Pulse Oximetry (%)  99   09/05/19 21:00

















Constitutional: Yes: Well Nourished, No Distress


Labs: 


  CBCD











WBC  8.7 K/mm3 (4.0-10.0)   09/04/19  06:30    


 


RBC  4.93 M/mm3 (4.00-5.60)   09/04/19  06:30    


 


Hgb  15.1 GM/dL (11.7-16.9)   09/04/19  06:30    


 


Hct  43.7 % (35.4-49)   09/04/19  06:30    


 


MCV  88.7 fl (80-96)   09/04/19  06:30    


 


MCHC  34.5 g/dl (32.0-35.9)   09/04/19  06:30    


 


RDW  13.0 % (11.9-15.9)   09/04/19  06:30    


 


Plt Count  280 K/MM3 (134-434)   09/04/19  06:30    


 


MPV  7.5 fl (7.5-11.1)   09/04/19  06:30    








 CMP











Sodium  142 mmol/L (136-145)   09/04/19  06:30    


 


Potassium  4.0 mmol/L (3.5-5.1)   09/04/19  06:30    


 


Chloride  101 mmol/L ()   09/04/19  06:30    


 


Carbon Dioxide  31 mmol/L (21-32)   09/04/19  06:30    


 


Anion Gap  10 MMOL/L (8-16)   09/04/19  06:30    


 


BUN  18.6 mg/dL (7-18)  H  09/04/19  06:30    


 


Creatinine  1.0 mg/dL (0.55-1.3)   09/04/19  06:30    


 


Calcium  8.8 mg/dL (8.5-10.1)   09/04/19  06:30    


 


Total Bilirubin  0.9 mg/dL (0.2-1)   08/31/19  06:20    


 


AST  19 U/L (15-37)   08/31/19  06:20    


 


ALT  45 U/L (13-61)   08/31/19  06:20    


 


Alkaline Phosphatase  94 U/L ()   08/31/19  06:20    


 


Total Protein  7.0 g/dl (6.4-8.2)   08/31/19  06:20    


 


Albumin  4.2 g/dl (3.4-5.0)   08/31/19  06:20    














- Neuro Exam


Level Of Consciousness: Yes: Alert, Oriented to Person (EOmi, no facial, motor 5

/5, inc tone in legs, L >R, brisk reflexes , and requires assitance when he 

walks )





Problem List





- Problems


(1) Multiple sclerosis exacerbation


Code(s): G35 - MULTIPLE SCLEROSIS   





(2) Gait abnormality


Code(s): R26.9 - UNSPECIFIED ABNORMALITIES OF GAIT AND MOBILITY   





Assessment/Plan


44 year old man with a PMH of MS (was on tecfidera, intermittent compliance for 

the past 6 months), who was sent by me first time yesetrday in office  for 

further management for MS. The patient reports hes been having 6 months of 

worsening blurry vision, weakness, lower extremity numbness-tingling, and 

urinary incontinence.  Patient  moved to [place] about 6 months ago from [place] 

and hasd been on Tecfidera sincve 2013 but ran out of RX apx 2 months ago.  

Denies fever, chills, nausea, vomiting, diarrhea, dysuria or hematuria. Denies 

alcohol abuse or use of illicit drugs. Nonsmoker.  ++ walking disability, and 

now needs cane. 





AP : 


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 


started on SOlumedrol IV 250IV q6 x 5 days --complete today , continues to have 

hemiplegic gait and unsteady 





MRI BRAIN, C , T spine with JASON--multifocal diffuse signal chnages in MRI C 

spine more suggestive of NMO disease 





cont  gabapentin 600BID , cont pamelor 50 mg poqd, tramadol for pain 





JCV titers (-), NMO AB (-), TSH, CLARENCE , ESR, LYme --P 





cleared for rehab later today 





FU outpt 








DR MARRERO 


[id number]

















Problem List





- Problems


(1) Multiple sclerosis exacerbation


Code(s): G35 - MULTIPLE SCLEROSIS   





(2) Gait abnormality


Code(s): R26.9 - UNSPECIFIED ABNORMALITIES OF GAIT AND MOBILITY

## 2019-09-07 NOTE — PDOC
Documentation entered by Monisha Barton SCRIBE, acting as scribe for Fay Sylvester MD.








Fay Sylvester MD:  This documentation has been prepared by the Mick chirinos Lincy, SCRIBE, under my direction and personally reviewed by me in its 

entirety.  I confirm that the documentation accurately reflects all work, 

treatment, procedures, and medical decision making performed by me.  





Attending Attestation





- Resident


Resident Name: Kurt Mohr





- ED Attending Attestation


I have performed the following: I have examined & evaluated the patient, The 

case was reviewed & discussed with the resident, I agree w/resident's findings 

& plan





- HPI


HPI: 


08/30/19 22:15


The patient is a 44 year old male with a past medical history significant for 

MS (was on tecfidera, intermittent compliance for the past 6 months), who was 

sent to the emergency department by Dr. Edwards for admission for further 

management for MS. The patient reports hes been having 6 months of worsening 

blurry vision, weakness, lower extremity numbness-tingling, and urinary 

incontinence. The patient was seen at Dr. Frazier office earlier today, from 

where he was sent to the emergency department for further management. The 

patient reports he moved to [place] about 6 months ago from [place] since then he

s been poorly compliant with his medication. 





- Physicial Exam


PE: 





09/07/19 06:51


Agree [facility] resident exam





- Medical Decision Making





09/07/19 06:51


Dr. Edwards is aware the patient is here. He is requesting that we order the MRI 

from the ER; to be done tomorrow.


Pt has normal labs and is stable for admission. Meds given